# Patient Record
Sex: MALE | Race: WHITE | NOT HISPANIC OR LATINO | Employment: FULL TIME | URBAN - METROPOLITAN AREA
[De-identification: names, ages, dates, MRNs, and addresses within clinical notes are randomized per-mention and may not be internally consistent; named-entity substitution may affect disease eponyms.]

---

## 2017-08-05 ENCOUNTER — TRANSCRIBE ORDERS (OUTPATIENT)
Dept: ADMINISTRATIVE | Age: 37
End: 2017-08-05

## 2017-08-05 ENCOUNTER — APPOINTMENT (OUTPATIENT)
Dept: LAB | Age: 37
End: 2017-08-05

## 2017-08-05 DIAGNOSIS — Z00.8 HEALTH EXAMINATION IN POPULATION SURVEY: ICD-10-CM

## 2017-08-05 DIAGNOSIS — Z00.8 HEALTH EXAMINATION IN POPULATION SURVEY: Primary | ICD-10-CM

## 2017-08-05 LAB
CHOLEST SERPL-MCNC: 197 MG/DL (ref 50–200)
EST. AVERAGE GLUCOSE BLD GHB EST-MCNC: 103 MG/DL
HBA1C MFR BLD: 5.2 % (ref 4.2–6.3)
HDLC SERPL-MCNC: 47 MG/DL (ref 40–60)
LDLC SERPL CALC-MCNC: 121 MG/DL (ref 0–100)
TRIGL SERPL-MCNC: 144 MG/DL

## 2017-08-05 PROCEDURE — 36415 COLL VENOUS BLD VENIPUNCTURE: CPT

## 2017-08-05 PROCEDURE — 80061 LIPID PANEL: CPT

## 2017-08-05 PROCEDURE — 83036 HEMOGLOBIN GLYCOSYLATED A1C: CPT

## 2018-01-18 NOTE — PROGRESS NOTES
Assessment    1  Acute swimmer's ear of left side (380 12) (H60 332)    Plan  Acute swimmer's ear of left side    · Ciprofloxacin HCl - 0 2 % Otic Solution; 4 drops to R ear BID    Discussion/Summary  Discussion Summary:   Try to keep water out of the ear  Do not use Q-tips in ear  This year should be rechecked in 4-5 days if not improving  Chief Complaint  Chief Complaint Free Text Note Form: pt reports left ear pain since Thurday      History of Present Illness  HPI: Left ear pain over last several days  Had been at the beach last week  Had tried alcohol drops which have not opened up the ear  There is no fevers, no nasal congestion, or sore throat  Hospital Based Practices Required Assessment:   Pain Assessment   the patient states they have pain  The pain is located in the left ear  The patient describes the pain as aching  (on a scale of 0 to 10, the patient rates the pain at 4 )   Abuse And Domestic Violence Screen    Yes, the patient is safe at home  The patient states no one is hurting them  Depression And Suicide Screen  No, the patient has not had thoughts of hurting themself  No, the patient has not felt depressed in the past 7 days  Prefered Language is  [place]  Primary Language is  English  Review of Systems  Focused-Male:   Constitutional: no fever, not feeling poorly, no chills and not feeling tired  ENT: as noted in HPI  Respiratory: no shortness of breath, no cough and no wheezing  Active Problems    1  Abdominal pain (789 00) (R10 9)   2  Lipoma of skin and subcutaneous tissue (214 1) (D17 30)    Past Medical History    1  History of Patient denies medical problems (V49 89) (Z78 9)  Active Problems And Past Medical History Reviewed: The active problems and past medical history were reviewed and updated today  Family History  Father    1  Family history of diabetes mellitus (V18 0) (Z83 3)  Maternal Grandmother    2   Family history of Ovarian cancer  Paternal Grandmother    3  Family history of arthritis (V17 7) (Z82 61)  Paternal Grandfather    4  Family history of arthritis (V17 7) (Z82 61)    Social History    · Always uses seat belt   · Full-time employment   ·    · Never a smoker   · No drug use   · Occasional alcohol use  Social History Reviewed: The social history was reviewed and updated today  Surgical History    1  History of Appendectomy    Current Meds   1  No Reported Medications Recorded  Medication List Reviewed: The medication list was reviewed and updated today  Allergies    1  Amoxicillin TABS    Physical Exam    Constitutional   General appearance: No acute distress, well appearing and well nourished  Eyes   Conjunctiva and lids: No swelling, erythema, or discharge  Pupils and irises: Equal, round and reactive to light  Ears, Nose, Mouth, and Throat   External inspection of ears and nose: Abnormal   Pain with traction left ear lobe  Otoscopic examination: Abnormal   The canal is swollen with some debris being present  The tympanic membrane is partially visualized and is not erythematous  Nasal mucosa, septum, and turbinates: Normal without edema or erythema  Oropharynx: Normal with no erythema, edema, exudate or lesions  Pulmonary   Respiratory effort: No increased work of breathing or signs of respiratory distress  Auscultation of lungs: Clear to auscultation  Cardiovascular   Auscultation of heart: Normal rate and rhythm, normal S1 and S2, without murmurs  Lymphatic   Palpation of lymph nodes in neck: No lymphadenopathy         Signatures   Electronically signed by : Shandra Hamilton MD; Jul 30 2016 10:10AM EST                       (Author)

## 2018-08-05 ENCOUNTER — OFFICE VISIT (OUTPATIENT)
Dept: URGENT CARE | Age: 38
End: 2018-08-05
Payer: COMMERCIAL

## 2018-08-05 VITALS
HEIGHT: 72 IN | BODY MASS INDEX: 28.17 KG/M2 | DIASTOLIC BLOOD PRESSURE: 90 MMHG | HEART RATE: 72 BPM | RESPIRATION RATE: 20 BRPM | WEIGHT: 208 LBS | OXYGEN SATURATION: 98 % | TEMPERATURE: 97.8 F | SYSTOLIC BLOOD PRESSURE: 150 MMHG

## 2018-08-05 DIAGNOSIS — J02.9 ACUTE PHARYNGITIS, UNSPECIFIED ETIOLOGY: Primary | ICD-10-CM

## 2018-08-05 LAB — S PYO AG THROAT QL: NEGATIVE

## 2018-08-05 PROCEDURE — 87430 STREP A AG IA: CPT | Performed by: FAMILY MEDICINE

## 2018-08-05 PROCEDURE — S9088 SERVICES PROVIDED IN URGENT: HCPCS | Performed by: FAMILY MEDICINE

## 2018-08-05 PROCEDURE — 99213 OFFICE O/P EST LOW 20 MIN: CPT | Performed by: FAMILY MEDICINE

## 2018-08-05 RX ORDER — AZITHROMYCIN 250 MG/1
TABLET, FILM COATED ORAL
Qty: 6 TABLET | Refills: 0 | Status: SHIPPED | OUTPATIENT
Start: 2018-08-05 | End: 2018-08-10

## 2018-08-05 NOTE — PATIENT INSTRUCTIONS
Rapid strep negative  Will treat based on known exposure to strep  Tylenol or motrin as needed for pain or fever  Salt water gargles and throat lozenges as needed  Chloraseptic spray may help with pain  Follow up with PCP if no improvement  Go to ER with worsening symptoms  Pharyngitis   AMBULATORY CARE:   Pharyngitis , or sore throat, is inflammation of the tissues and structures in your pharynx (throat)  Pharyngitis is most often caused by bacteria  It may also be caused by a cold or flu virus  Other causes include smoking, allergies, or acid reflux  Signs and symptoms that may occur with pharyngitis:   · Sore throat or pain when you swallow    · Fever, chills, and body aches    · Hoarse or raspy voice    · Cough, runny or stuffy nose, itchy or watery eyes    · Headache    · Upset stomach and loss of appetite    · Mild neck stiffness    · Swollen glands that feel like hard lumps when you touch your neck    · White and yellow pus-filled blisters in the back of your throat  Call 911 for any of the following:   · You have trouble breathing or swallowing because your throat is swollen or sore  Seek care immediately if:   · You are drooling because it hurts too much to swallow  · Your fever is higher than 102? F (39?C) or lasts longer than 3 days  · You are confused  · You taste blood in your throat  Contact your healthcare provider if:   · Your throat pain gets worse  · You have a painful lump in your throat that does not go away after 5 days  · Your symptoms do not improve after 5 days  · You have questions or concerns about your condition or care  Treatment for pharyngitis:  Viral pharyngitis will go away on its own without treatment  Your sore throat should start to feel better in 3 to 5 days for both viral and bacterial infections  You may need any of the following  · NSAIDs , such as ibuprofen, help decrease swelling, pain, and fever   NSAIDs can cause stomach bleeding or kidney problems in certain people  If you take blood thinner medicine, always ask your healthcare provider if NSAIDs are safe for you  Always read the medicine label and follow directions  · Acetaminophen  decreases pain and fever  It is available without a doctor's order  Ask how much to take and how often to take it  Follow directions  Acetaminophen can cause liver damage if not taken correctly  Manage your symptoms:   · Gargle salt water  Mix ¼ teaspoon salt in an 8 ounce glass of warm water and gargle  This may help decrease swelling in your throat  · Drink liquids as directed  You may need to drink more liquids than usual  Liquids may help soothe your throat and prevent dehydration  Ask how much liquid to drink each day and which liquids are best for you  · Use a cool-steam humidifier  to help moisten the air in your room and calm your cough  · Soothe your throat  with cough drops, ice, soft foods, or popsicles  Prevent the spread of pharyngitis:  Cover your mouth and nose when you cough or sneeze  Do not share food or drinks  Wash your hands often  Use soap and water  If soap and water are unavailable, use an alcohol based hand   Follow up with your healthcare provider as directed:  Write down your questions so you remember to ask them during your visits  © 2017 [address] Information is for End User's use only and may not be sold, redistributed or otherwise used for commercial purposes  All illustrations and images included in CareNotes® are the copyrighted property of A D A M , Inc  or Aren Szymanski  The above information is an  only  It is not intended as medical advice for individual conditions or treatments  Talk to your doctor, nurse or pharmacist before following any medical regimen to see if it is safe and effective for you

## 2018-08-05 NOTE — PROGRESS NOTES
[facility]        NAME: Holley Osgood is a 34 y o  male  : 1980    MRN: [id number]  DATE: 2018  TIME: 9:26 AM    Assessment and Plan   Acute pharyngitis, unspecified etiology [J02 9]  1  Acute pharyngitis, unspecified etiology  azithromycin (ZITHROMAX) 250 mg tablet         Patient Instructions     Patient Instructions   Rapid strep negative  Will treat based on known exposure to strep  Tylenol or motrin as needed for pain or fever  Salt water gargles and throat lozenges as needed  Chloraseptic spray may help with pain  Follow up with PCP if no improvement  Go to ER with worsening symptoms  Chief Complaint     Chief Complaint   Patient presents with    Sore Throat     Been sick for the last 2 1/2 days with sore throat- concerned been around people who had strep throat    Cold Like Symptoms         History of Present Illness   Holley Osgood presents to the clinic c/o    This is a 34year old male here today with complaints of sore throat, congestion  Symptoms have been for about 2 5 days  No fevers  He has been using OTC cough drops  He states he was recently exposed to strep from family members while on vacation  Review of Systems   Review of Systems   Constitutional: Negative  HENT: Positive for congestion and sore throat  Negative for sinus pain and sinus pressure  Respiratory: Negative  Neurological: Negative  Psychiatric/Behavioral: Negative  Current Medications     No long-term prescriptions on file         Current Allergies     Allergies as of 2018 - Reviewed 2018   Allergen Reaction Noted    Amoxicillin  2015            The following portions of the patient's history were reviewed and updated as appropriate: allergies, current medications, past family history, past medical history, past social history, past surgical history and problem list     Objective   /90   Pulse 72   Temp 97 8 °F (36 6 °C) (Temporal) Resp 20   Ht 6' (1 829 m)   Wt 94 3 kg (208 lb)   SpO2 98%   BMI 28 21 kg/m²        Physical Exam     Physical Exam   Constitutional: He is oriented to person, place, and time  He appears well-developed and well-nourished  HENT:   Head: Normocephalic  Right Ear: External ear normal    Left Ear: External ear normal    Posterior pharynx erythemic and beefy red  Neck: Normal range of motion  Neck supple  Cardiovascular: Normal rate, regular rhythm and normal heart sounds  Pulmonary/Chest: Effort normal and breath sounds normal    Neurological: He is alert and oriented to person, place, and time  Skin: Skin is warm and dry  Psychiatric: He has a normal mood and affect  His behavior is normal    Nursing note and vitals reviewed  Rapid strep negative

## 2018-08-18 ENCOUNTER — APPOINTMENT (OUTPATIENT)
Dept: LAB | Age: 38
End: 2018-08-18

## 2018-08-18 ENCOUNTER — TRANSCRIBE ORDERS (OUTPATIENT)
Dept: ADMINISTRATIVE | Age: 38
End: 2018-08-18

## 2018-08-18 DIAGNOSIS — Z00.8 HEALTH EXAMINATION IN POPULATION SURVEY: Primary | ICD-10-CM

## 2018-08-18 DIAGNOSIS — Z00.8 HEALTH EXAMINATION IN POPULATION SURVEY: ICD-10-CM

## 2018-08-18 LAB
CHOLEST SERPL-MCNC: 173 MG/DL (ref 50–200)
EST. AVERAGE GLUCOSE BLD GHB EST-MCNC: 100 MG/DL
HBA1C MFR BLD: 5.1 % (ref 4.2–6.3)
HDLC SERPL-MCNC: 42 MG/DL (ref 40–60)
LDLC SERPL CALC-MCNC: 109 MG/DL (ref 0–100)
NONHDLC SERPL-MCNC: 131 MG/DL
TRIGL SERPL-MCNC: 110 MG/DL

## 2018-08-18 PROCEDURE — 36415 COLL VENOUS BLD VENIPUNCTURE: CPT

## 2018-08-18 PROCEDURE — 80061 LIPID PANEL: CPT

## 2018-08-18 PROCEDURE — 83036 HEMOGLOBIN GLYCOSYLATED A1C: CPT

## 2021-04-08 DIAGNOSIS — Z23 ENCOUNTER FOR IMMUNIZATION: ICD-10-CM

## 2021-04-09 ENCOUNTER — IMMUNIZATIONS (OUTPATIENT)
Dept: FAMILY MEDICINE CLINIC | Facility: HOSPITAL | Age: 41
End: 2021-04-09

## 2021-04-09 DIAGNOSIS — Z23 ENCOUNTER FOR IMMUNIZATION: ICD-10-CM

## 2021-11-09 ENCOUNTER — IMMUNIZATIONS (OUTPATIENT)
Dept: FAMILY MEDICINE CLINIC | Facility: HOSPITAL | Age: 41
End: 2021-11-09

## 2021-11-09 DIAGNOSIS — Z23 ENCOUNTER FOR IMMUNIZATION: Primary | ICD-10-CM

## 2021-11-09 PROCEDURE — 91300 COVID-19 PFIZER VACC 0.3 ML: CPT

## 2021-11-09 PROCEDURE — 0001A COVID-19 PFIZER VACC 0.3 ML: CPT

## 2025-02-28 ENCOUNTER — OFFICE VISIT (OUTPATIENT)
Dept: URGENT CARE | Age: 45
End: 2025-02-28
Payer: COMMERCIAL

## 2025-02-28 ENCOUNTER — HOSPITAL ENCOUNTER (EMERGENCY)
Facility: HOSPITAL | Age: 45
Discharge: HOME | End: 2025-03-01
Attending: EMERGENCY MEDICINE
Payer: COMMERCIAL

## 2025-02-28 ENCOUNTER — APPOINTMENT (EMERGENCY)
Dept: RADIOLOGY | Facility: HOSPITAL | Age: 45
End: 2025-02-28
Payer: COMMERCIAL

## 2025-02-28 VITALS
RESPIRATION RATE: 20 BRPM | TEMPERATURE: 97.8 F | BODY MASS INDEX: 32.79 KG/M2 | WEIGHT: 234.2 LBS | DIASTOLIC BLOOD PRESSURE: 100 MMHG | OXYGEN SATURATION: 97 % | HEART RATE: 85 BPM | SYSTOLIC BLOOD PRESSURE: 130 MMHG | HEIGHT: 71 IN

## 2025-02-28 DIAGNOSIS — I10 HYPERTENSION: ICD-10-CM

## 2025-02-28 DIAGNOSIS — R51.9 OCCIPITAL HEADACHE: Primary | ICD-10-CM

## 2025-02-28 DIAGNOSIS — R51.9 HEADACHE: Primary | ICD-10-CM

## 2025-02-28 LAB
ALBUMIN SERPL BCG-MCNC: 4.6 G/DL (ref 3.5–5)
ALP SERPL-CCNC: 62 U/L (ref 34–104)
ALT SERPL W P-5'-P-CCNC: 60 U/L (ref 7–52)
ANION GAP SERPL CALCULATED.3IONS-SCNC: 8 MMOL/L (ref 4–13)
AST SERPL W P-5'-P-CCNC: 33 U/L (ref 13–39)
BASOPHILS # BLD AUTO: 0.04 THOUSANDS/ÂΜL (ref 0–0.1)
BASOPHILS NFR BLD AUTO: 1 % (ref 0–1)
BILIRUB SERPL-MCNC: 0.48 MG/DL (ref 0.2–1)
BUN SERPL-MCNC: 14 MG/DL (ref 5–25)
CALCIUM SERPL-MCNC: 10.1 MG/DL (ref 8.4–10.2)
CHLORIDE SERPL-SCNC: 103 MMOL/L (ref 96–108)
CO2 SERPL-SCNC: 27 MMOL/L (ref 21–32)
CREAT SERPL-MCNC: 0.91 MG/DL (ref 0.6–1.3)
EOSINOPHIL # BLD AUTO: 0.28 THOUSAND/ÂΜL (ref 0–0.61)
EOSINOPHIL NFR BLD AUTO: 4 % (ref 0–6)
ERYTHROCYTE [DISTWIDTH] IN BLOOD BY AUTOMATED COUNT: 11.5 % (ref 11.6–15.1)
GFR SERPL CREATININE-BSD FRML MDRD: 100 ML/MIN/1.73SQ M
GLUCOSE SERPL-MCNC: 90 MG/DL (ref 65–140)
HCT VFR BLD AUTO: 44.8 % (ref 36.5–49.3)
HGB BLD-MCNC: 15.8 G/DL (ref 12–17)
IMM GRANULOCYTES # BLD AUTO: 0.02 THOUSAND/UL (ref 0–0.2)
IMM GRANULOCYTES NFR BLD AUTO: 0 % (ref 0–2)
LYMPHOCYTES # BLD AUTO: 3.07 THOUSANDS/ÂΜL (ref 0.6–4.47)
LYMPHOCYTES NFR BLD AUTO: 43 % (ref 14–44)
MCH RBC QN AUTO: 31 PG (ref 26.8–34.3)
MCHC RBC AUTO-ENTMCNC: 35.3 G/DL (ref 31.4–37.4)
MCV RBC AUTO: 88 FL (ref 82–98)
MONOCYTES # BLD AUTO: 0.55 THOUSAND/ÂΜL (ref 0.17–1.22)
MONOCYTES NFR BLD AUTO: 8 % (ref 4–12)
NEUTROPHILS # BLD AUTO: 3.26 THOUSANDS/ÂΜL (ref 1.85–7.62)
NEUTS SEG NFR BLD AUTO: 44 % (ref 43–75)
NRBC BLD AUTO-RTO: 0 /100 WBCS
PLATELET # BLD AUTO: 189 THOUSANDS/UL (ref 149–390)
PMV BLD AUTO: 11.9 FL (ref 8.9–12.7)
POTASSIUM SERPL-SCNC: 4.2 MMOL/L (ref 3.5–5.3)
PROT SERPL-MCNC: 7 G/DL (ref 6.4–8.4)
RBC # BLD AUTO: 5.1 MILLION/UL (ref 3.88–5.62)
SODIUM SERPL-SCNC: 138 MMOL/L (ref 135–147)
WBC # BLD AUTO: 7.22 THOUSAND/UL (ref 4.31–10.16)

## 2025-02-28 PROCEDURE — 85025 COMPLETE CBC W/AUTO DIFF WBC: CPT

## 2025-02-28 PROCEDURE — 96368 THER/DIAG CONCURRENT INF: CPT

## 2025-02-28 PROCEDURE — 80053 COMPREHEN METABOLIC PANEL: CPT

## 2025-02-28 PROCEDURE — 70496 CT ANGIOGRAPHY HEAD: CPT

## 2025-02-28 PROCEDURE — 99284 EMERGENCY DEPT VISIT MOD MDM: CPT

## 2025-02-28 PROCEDURE — 36415 COLL VENOUS BLD VENIPUNCTURE: CPT

## 2025-02-28 PROCEDURE — 70498 CT ANGIOGRAPHY NECK: CPT

## 2025-02-28 PROCEDURE — 96366 THER/PROPH/DIAG IV INF ADDON: CPT

## 2025-02-28 PROCEDURE — 99203 OFFICE O/P NEW LOW 30 MIN: CPT

## 2025-02-28 PROCEDURE — 96375 TX/PRO/DX INJ NEW DRUG ADDON: CPT

## 2025-02-28 PROCEDURE — 96365 THER/PROPH/DIAG IV INF INIT: CPT

## 2025-02-28 PROCEDURE — 99285 EMERGENCY DEPT VISIT HI MDM: CPT | Performed by: EMERGENCY MEDICINE

## 2025-02-28 RX ORDER — MAGNESIUM SULFATE HEPTAHYDRATE 40 MG/ML
2 INJECTION, SOLUTION INTRAVENOUS ONCE
Status: COMPLETED | OUTPATIENT
Start: 2025-02-28 | End: 2025-02-28

## 2025-02-28 RX ORDER — METOCLOPRAMIDE HYDROCHLORIDE 5 MG/ML
10 INJECTION INTRAMUSCULAR; INTRAVENOUS ONCE
Status: COMPLETED | OUTPATIENT
Start: 2025-02-28 | End: 2025-02-28

## 2025-02-28 RX ORDER — METHOCARBAMOL 500 MG/1
500 TABLET, FILM COATED ORAL 2 TIMES DAILY PRN
Qty: 10 TABLET | Refills: 0 | Status: SHIPPED | OUTPATIENT
Start: 2025-02-28 | End: 2025-02-28

## 2025-02-28 RX ORDER — ACETAMINOPHEN 325 MG/1
650 TABLET ORAL ONCE
Status: COMPLETED | OUTPATIENT
Start: 2025-02-28 | End: 2025-02-28

## 2025-02-28 RX ORDER — LABETALOL HYDROCHLORIDE 5 MG/ML
10 INJECTION, SOLUTION INTRAVENOUS ONCE
Status: DISCONTINUED | OUTPATIENT
Start: 2025-02-28 | End: 2025-03-01 | Stop reason: HOSPADM

## 2025-02-28 RX ORDER — METHOCARBAMOL 500 MG/1
500 TABLET, FILM COATED ORAL 2 TIMES DAILY PRN
Qty: 10 TABLET | Refills: 0 | Status: SHIPPED | OUTPATIENT
Start: 2025-02-28 | End: 2025-03-06

## 2025-02-28 RX ORDER — AMLODIPINE BESYLATE 2.5 MG/1
2.5 TABLET ORAL DAILY
Qty: 30 TABLET | Refills: 0 | Status: SHIPPED | OUTPATIENT
Start: 2025-02-28 | End: 2025-03-01

## 2025-02-28 RX ADMIN — MAGNESIUM SULFATE HEPTAHYDRATE 2 G: 40 INJECTION, SOLUTION INTRAVENOUS at 21:27

## 2025-02-28 RX ADMIN — IOHEXOL 75 ML: 350 INJECTION, SOLUTION INTRAVENOUS at 22:06

## 2025-02-28 RX ADMIN — METOCLOPRAMIDE 10 MG: 5 INJECTION, SOLUTION INTRAMUSCULAR; INTRAVENOUS at 21:27

## 2025-02-28 RX ADMIN — SODIUM CHLORIDE, SODIUM LACTATE, POTASSIUM CHLORIDE, AND CALCIUM CHLORIDE 1000 ML: .6; .31; .03; .02 INJECTION, SOLUTION INTRAVENOUS at 21:27

## 2025-02-28 RX ADMIN — ACETAMINOPHEN 650 MG: 325 TABLET, FILM COATED ORAL at 21:27

## 2025-02-28 NOTE — Clinical Note
Lavell Garcia was seen and treated in our emergency department on 2/28/2025.                Diagnosis: Nonintractable Headache    Lavell  .    He may return on this date: 03/03/2025    Patient will require strict outpatient follow up, please excuse him on date of follow up appointment currently TBD pending availability.     If you have any questions or concerns, please don't hesitate to call.      Nanette Voss, DO    ______________________________           _______________          _______________  Hospital Representative                              Date                                Time

## 2025-02-28 NOTE — PATIENT INSTRUCTIONS
Please begin Robaxin as directed-do not drive or operate machinery within 8 hours of taking.   Please continue Tylenol, may alternate with Ibuprofen as needed.   Follow up with PCP as soon as possible.   Strict ED precautions reviewed with patient. Strongly recommend further evaluation in the emergency department for worsening headache, dizziness, vision changes, etc.

## 2025-02-28 NOTE — PROGRESS NOTES
[facility]  Name: Lavell Garcia      : 1980      MRN: [id number]  Encounter Provider: MIESHA Paz  Encounter Date: 2025   Encounter department: [facility] [place]  :  44 year old male presents with 5-day history of occipital headache which responds to interventions which relax neck muscles, will add muscle relaxer and NSAID to medication regimen. No red flag signs/symptoms on exam.   Please begin Robaxin as directed-do not drive or operate machinery within 8 hours of taking.   Please continue Tylenol, may alternate with Ibuprofen as needed.   Follow up with PCP as soon as possible.   Strict ED precautions reviewed with patient. Strongly recommend further evaluation in the emergency department for worsening headache, dizziness, vision changes, etc.   Assessment & Plan  Occipital headache    Orders:    methocarbamol (ROBAXIN) 500 mg tablet; Take 1 tablet (500 mg total) by mouth 2 (two) times a day as needed for muscle spasms (occipital headache) for up to 5 days        Patient Instructions  Patient Education     Headache, Adult ED   General Information   You came to the Emergency Department (ED) today for a headache. The doctors feel it is unlikely that something serious is causing your headache and it is safe for you to recover at home.  You may be waiting on some test results. If so, the staff will contact you if there are concerning results.  What care is needed at home?   Call your regular doctor to let them know you were in the ED. Make a follow-up appointment if you are told to.  You can take drugs like acetaminophen, ibuprofen, or naproxen for pain as instructed, but use of these pain medicines should be limited. If you need to take pain medicines every day for headaches, call your doctor.  If possible, lie down in a quiet, dark room.  Make sure you eat at regular times. Do not skip meals. Drink plenty of fluids. Be sure you are getting enough sleep.  If you have  "frequent headaches that interfere with your activities, you can keep a \"headache diary.\" This might help to see if there is a pattern to your headaches. Make notes about:  Where your pain is on your head or neck.  When you have the pain and how long it lasts.  How your pain feels. Is it dull, sharp, burning, stabbing, or cramping?  What causes your pain?  What makes your pain better or worse?  When do I need to get emergency help?   Call for an ambulance right away if:   You have a seizure.  You have signs of stroke like sudden:  Numbness or weakness of the face, arm. or leg, especially on one side of the body.  Confusion, trouble speaking, or understanding.  Trouble seeing in one or both eyes.  Trouble walking, dizziness, loss of balance, or coordination.  Severe headache with no known cause.  You feel extremely weak, confused, or lethargic, or you pass out.  You have a headache along with neck pain, neck stiffness, fever, or chills.  You have a headache along with a new skin rash.  You have significant nausea or vomiting with your headache.  When do I need to call the doctor?   The headache lasts more than a few days or the pain gets worse or comes more often.  You have new or worsening symptoms.  Last Reviewed Date   2020-06-16  Consumer Information Use and Disclaimer   This generalized information is a limited summary of diagnosis, treatment, and/or medication information. It is not meant to be comprehensive and should be used as a tool to help the user understand and/or assess potential diagnostic and treatment options. It does NOT include all information about conditions, treatments, medications, side effects, or risks that may apply to a specific patient. It is not intended to be medical advice or a substitute for the medical advice, diagnosis, or treatment of a health care provider based on the health care provider's examination and assessment of a patient’s specific and unique circumstances. Patients must " "speak with a health care provider for complete information about their health, medical questions, and treatment options, including any risks or benefits regarding use of medications. This information does not endorse any treatments or medications as safe, effective, or approved for treating a specific patient. [employer], Inc[employer] and its affiliates disclaim any warranty or liability relating to this information or the use thereof. The use of this information is governed by the Terms of Use, available at https://www.JoMaJaer.com/en/know/clinical-effectiveness-terms   Copyright   Copyright © 2024 [employer], Inc. and its affiliates and/or licensors. All rights reserved.  Follow up with PCP in 3-5 days.  Proceed to  ER if symptoms worsen.    If tests are performed, our office will contact you with results only if changes need to made to the care plan discussed with you at the visit. You can review your full results on [facility] MyChart.    Chief Complaint:   Chief Complaint   Patient presents with    Headache     Started Sunday. Got a sharp pain in the back of head. Going up to his temple. Not congested, just a lot of pressure.       History of Present Illness   Patient is a 44 year old male with PMH significant for migraines, who presents for evaluation of occipital headache which began 5 days ago. He notes that the headache differs from his usual migraine (which occurs over the right eye), and reports that instead this headache starts on both sides of the back of his head and radiates to his bilateral temples. He initially attributed his symptoms to sinus congestion (although he does not feel particularly congested), and he took Mucinex and Tylenol without relief. He notes that the headache feels like a \"pressure\", is intermittent and seems to improve with neck stretches and hot showers. Neck movement seems to aggravate the pain, although he has no limitation in movement. He denies dizziness, vision changes, fever, " "tinnitus, chest pain, palpitations, numbness, tingling, focal weakness. He notes that he has not been to his PCP \"in years\".           Review of Systems   Constitutional:  Negative for fatigue and fever.   HENT:  Negative for congestion, ear discharge, ear pain, postnasal drip, rhinorrhea, sinus pressure, sinus pain, sneezing and sore throat.    Eyes: Negative.  Negative for pain, discharge, redness and itching.   Respiratory: Negative.  Negative for apnea, cough, choking, chest tightness, shortness of breath, wheezing and stridor.    Cardiovascular: Negative.  Negative for chest pain and palpitations.   Gastrointestinal: Negative.  Negative for diarrhea, nausea and vomiting.   Endocrine: Negative.  Negative for polydipsia, polyphagia and polyuria.   Genitourinary: Negative.  Negative for decreased urine volume and flank pain.   Musculoskeletal: Negative.  Negative for arthralgias, back pain, myalgias, neck pain and neck stiffness.   Skin: Negative.  Negative for color change and rash.   Allergic/Immunologic: Negative.  Negative for environmental allergies.   Neurological:  Positive for headaches. Negative for dizziness, facial asymmetry, light-headedness and numbness.   Hematological: Negative.  Negative for adenopathy.   Psychiatric/Behavioral: Negative.       Past Medical History   No past medical history on file.  No past surgical history on file.  No family history on file.  he reports that he has never smoked. He has never used smokeless tobacco. He reports that he does not drink alcohol.  Current Outpatient Medications   Medication Instructions    methocarbamol (ROBAXIN) 500 mg, Oral, 2 times daily PRN     Allergies   Allergen Reactions    Amoxicillin       Objective   /100   Pulse 85   Temp 97.8 °F (36.6 °C) (Tympanic)   Resp 20   Ht 5' 11\" (1.803 m)   Wt 106 kg (234 lb 3.2 oz)   SpO2 97%   BMI 32.66 kg/m²      Physical Exam  Vitals and nursing note reviewed.   Constitutional:       General: He " is not in acute distress.     Appearance: He is well-developed. He is not ill-appearing, toxic-appearing or diaphoretic.      Interventions: He is not intubated.  HENT:      Head: Normocephalic and atraumatic.      Right Ear: Hearing, tympanic membrane, ear canal and external ear normal. Tympanic membrane is not erythematous or bulging.      Left Ear: Hearing, tympanic membrane, ear canal and external ear normal. Tympanic membrane is not erythematous or bulging.   Eyes:      General: Lids are normal. Lids are everted, no foreign bodies appreciated. Vision grossly intact. Gaze aligned appropriately.      Extraocular Movements:      Right eye: Normal extraocular motion and no nystagmus.      Left eye: Normal extraocular motion and no nystagmus.      Conjunctiva/sclera: Conjunctivae normal.      Right eye: Right conjunctiva is not injected. No chemosis, exudate or hemorrhage.     Left eye: Left conjunctiva is not injected. No chemosis, exudate or hemorrhage.     Comments: No papilledema on funduscopic exam.    Neck:      Thyroid: No thyroid mass, thyromegaly or thyroid tenderness.      Meningeal: Brudzinski's sign absent.   Cardiovascular:      Rate and Rhythm: Normal rate and regular rhythm.      Heart sounds: Normal heart sounds, S1 normal and S2 normal. Heart sounds not distant. No murmur heard.  Pulmonary:      Effort: Pulmonary effort is normal. No tachypnea, bradypnea, accessory muscle usage, prolonged expiration, respiratory distress or retractions. He is not intubated.      Breath sounds: Normal breath sounds. No stridor, decreased air movement or transmitted upper airway sounds. No decreased breath sounds, wheezing, rhonchi or rales.   Abdominal:      Palpations: Abdomen is soft.      Tenderness: There is no abdominal tenderness.   Musculoskeletal:         General: No swelling.      Cervical back: Full passive range of motion without pain, normal range of motion and neck supple. No spinous process tenderness  or muscular tenderness. Normal range of motion.   Lymphadenopathy:      Cervical: No cervical adenopathy.      Right cervical: No superficial cervical adenopathy.     Left cervical: No superficial cervical adenopathy.   Skin:     General: Skin is warm and dry.      Capillary Refill: Capillary refill takes less than 2 seconds.   Neurological:      General: No focal deficit present.      Mental Status: He is alert and oriented to person, place, and time.      GCS: GCS eye subscore is 4. GCS verbal subscore is 5. GCS motor subscore is 6.      Cranial Nerves: Cranial nerves 2-12 are intact.      Sensory: Sensation is intact.      Motor: Motor function is intact.      Coordination: Coordination is intact.      Gait: Gait is intact.   Psychiatric:         Mood and Affect: Mood normal.

## 2025-03-01 VITALS
HEART RATE: 70 BPM | TEMPERATURE: 97.9 F | DIASTOLIC BLOOD PRESSURE: 88 MMHG | RESPIRATION RATE: 22 BRPM | SYSTOLIC BLOOD PRESSURE: 140 MMHG | OXYGEN SATURATION: 98 %

## 2025-03-01 RX ORDER — AMLODIPINE BESYLATE 2.5 MG/1
5 TABLET ORAL DAILY
Qty: 60 TABLET | Refills: 0 | Status: SHIPPED | OUTPATIENT
Start: 2025-03-01

## 2025-03-01 NOTE — ED ATTENDING ATTESTATION
"I, Chuy Bolton MD, saw and evaluated the patient. I have discussed the patient with the resident and agree with the resident's findings, Plan of Care, and MDM as documented in the resident's note, except where noted. All available labs and Radiology studies were reviewed.  I was present for key portions of any procedure(s) performed by the resident and I was immediately available to provide assistance.    At this point I agree with the current assessment done in the Emergency Department.  I have conducted an independent evaluation of this patient a history and physical is as follows:    43 yo male with a history of migraine headaches presents to the ED complaining of a headache x 6 days. The patient reports a sudden onset posterior headache while washing his car on Sunday afternoon. The pain is described as a \"pressure\" in the bilateral occiput that radiates into his bilateral temples. He initially thought the headache was due to sinus congestion so he took Mucinex and APAP but he did not get any relief. Since Sunday the headache has \"come and gone\". He reports some relief with stretching his neck and taking hot showers. No visual disturbance, nausea, vomiting, neck pain, or neck stiffness. He denies fevers, chills, numbness,and weakness. Headache is different than his typical migraine. He is also experiencing some \"heaviness\" in his eyes although his vision is normal. No other specific complaints.    *Of note, the patient was seen at an Urgent Care this afternoon and prescribed a muscle relaxer. He says his headache transiently improved after taking the medication but returned after about 30 minutes so he decided to come to the ED.    ROS: per resident physician note    Gen: NAD, AA&Ox3  HEENT: PERRL, EOMI, no nystagmus  Neck: supple, FROM without discomfort  CV: RRR  Lungs: CTA B/L  Abdomen: soft, NT/ND  Ext: no swelling or deformity  Neuro: 5/5 strength all extremities, sensation grossly intact, steady " gait  Skin: no rash    ED Course  The patient is comfortable appearing with stale vital signs other than a moderately elevated BP. Physical examination is benign. Unclear etiology of headache x 6 days. Migraine vs tension headache vs ICH vs hypertension-related headache? Very low clinical suspicion for an infectious process (meningitis, encephalitis). Will check basic labs and CTA head/neck. IVFs, APAP, Reglan, and Mg administered. Will continue to monitor in the ED. Disposition per workup and reassessment.    23:30 Care signed out to Dr. Landeros with formal CTA read and reassessment pending...    Critical Care Time  Procedures

## 2025-03-01 NOTE — ED PROVIDER NOTES
Time reflects when diagnosis was documented in both MDM as applicable and the Disposition within this note       Time User Action Codes Description Comment    2/28/2025 11:15 PM MargieKeshav trevino Add [R51.9] Headache     2/28/2025 11:28 PM Nanette Johnson Add [I10] Hypertension           ED Disposition       ED Disposition   Discharge    Condition   Stable    Date/Time   Sat Mar 1, 2025 12:19 AM    Comment   Lavell GRANT Radha discharge to home/self care.                   Assessment & Plan       Medical Decision Making  Given patient's symptoms headache that he states feels different from his migraine headache as well as sudden onset of maximal intensity proceeded to CTA his brain looking for any bleeds versus aneurysms versus tumors.  In addition to this we will also treat symptomatically with migraine medication.  After following up with the patient patient continued to have no neurological complaints other than the headache however he did state that his headache is improved and felt better overall.  Placed a referral to neurology for further migraine headache management.  Patient CTA head was negative and unremarkable.  Given this I have low concern for any intracranial abnormality and think patient is safe to follow-up with neurology as an outpatient.  Good return precautions noted.  Patient agreeable stable for discharge.    Amount and/or Complexity of Data Reviewed  Labs: ordered.  Radiology: ordered.    Risk  OTC drugs.  Prescription drug management.             Medications   lactated ringers bolus 1,000 mL (0 mL Intravenous Stopped 2/28/25 2332)   metoclopramide (REGLAN) injection 10 mg (10 mg Intravenous Given 2/28/25 2127)   magnesium sulfate 2 g/50 mL IVPB (premix) 2 g (0 g Intravenous Stopped 2/28/25 2332)   acetaminophen (TYLENOL) tablet 650 mg (650 mg Oral Given 2/28/25 2127)   iohexol (OMNIPAQUE) 350 MG/ML injection (SINGLE-DOSE) 75 mL (75 mL Intravenous Given 2/28/25 2206)       ED Risk Strat Scores     "                                            History of Present Illness       Chief Complaint   Patient presents with    Headache     Pt c/o of \"spontaneous headache\" that started last Sunday. Pt states he was at [facility] earlier today where they gave him a muscle relaxer and told him to come to the ER if his headache didn't improve. Pt states the headache never fully went away.       History reviewed. No pertinent past medical history.   History reviewed. No pertinent surgical history.   History reviewed. No pertinent family history.   Social History     Tobacco Use    Smoking status: Never    Smokeless tobacco: Never   Substance Use Topics    Alcohol use: No      E-Cigarette/Vaping      E-Cigarette/Vaping Substances      I have reviewed and agree with the history as documented.     Patient is a 44-year-old male who states that 6 days ago he was washing his car when he had a sudden onset maximal intensity posterior headache radiating to the front of his with some eye heaviness.  He states he went to the urgent care and was prescribed Robaxin which helped him for about 30 minutes but the headache returned.  He states that he tried his normal migraine medication but the headache is refractory.  He states that he does not feel like his typical migraine headache.  Patient denies any numbness, weakness, changes in vision, double vision, lightheadedness, difficulty ambulating.      Headache  Associated symptoms: no abdominal pain, no cough, no fever and no vomiting        Review of Systems   Constitutional:  Negative for fever.   Respiratory:  Negative for cough and shortness of breath.    Cardiovascular:  Negative for chest pain and palpitations.   Gastrointestinal:  Negative for abdominal pain and vomiting.   Genitourinary:  Negative for dysuria.   Skin:  Negative for rash.   Neurological:  Positive for headaches. Negative for syncope.   All other systems reviewed and are negative.          Objective       ED " Triage Vitals [02/28/25 2009]   Temperature Pulse Blood Pressure Respirations SpO2 Patient Position - Orthostatic VS   97.9 °F (36.6 °C) 70 (!) 201/147 22 98 % Sitting      Temp src Heart Rate Source BP Location FiO2 (%) Pain Score    -- -- Left arm -- --      Vitals      Date and Time Temp Pulse SpO2 Resp BP Pain Score FACES Pain Rating User   03/01/25 0022 -- -- -- -- 140/88 -- -- JT   02/28/25 2218 -- -- -- -- 148/92 -- -- JT   02/28/25 2137 -- -- -- -- 170/100 -- -- JT   02/28/25 2009 97.9 °F (36.6 °C) 70 98 % 22 201/147 -- -- RG            Physical Exam  Vitals and nursing note reviewed.   Constitutional:       General: He is not in acute distress.     Appearance: He is well-developed.   HENT:      Head: Normocephalic and atraumatic.   Eyes:      Conjunctiva/sclera: Conjunctivae normal.   Cardiovascular:      Rate and Rhythm: Normal rate and regular rhythm.   Pulmonary:      Effort: Pulmonary effort is normal. No respiratory distress.   Abdominal:      General: There is no distension.      Palpations: Abdomen is soft.   Musculoskeletal:         General: Normal range of motion.      Cervical back: Normal range of motion.   Skin:     Capillary Refill: Capillary refill takes less than 2 seconds.   Neurological:      Mental Status: He is alert and oriented to person, place, and time. Mental status is at baseline.      GCS: GCS eye subscore is 4. GCS verbal subscore is 5. GCS motor subscore is 6.      Cranial Nerves: Cranial nerves 2-12 are intact. No cranial nerve deficit, dysarthria or facial asymmetry.      Sensory: Sensation is intact.      Motor: Motor function is intact.      Coordination: Coordination normal. Finger-Nose-Finger Test normal.      Gait: Gait is intact.   Psychiatric:         Mood and Affect: Mood normal.         Results Reviewed       Procedure Component Value Units Date/Time    Comprehensive metabolic panel [[id number]]  (Abnormal) Collected: 02/28/25 2109    Lab Status: Final result Specimen:  Blood from Arm, Right Updated: 02/28/25 2144     Sodium 138 mmol/L      Potassium 4.2 mmol/L      Chloride 103 mmol/L      CO2 27 mmol/L      ANION GAP 8 mmol/L      BUN 14 mg/dL      Creatinine 0.91 mg/dL      Glucose 90 mg/dL      Calcium 10.1 mg/dL      AST 33 U/L      ALT 60 U/L      Alkaline Phosphatase 62 U/L      Total Protein 7.0 g/dL      Albumin 4.6 g/dL      Total Bilirubin 0.48 mg/dL      eGFR 100 ml/min/1.73sq m     Narrative:      National Kidney Disease Foundation guidelines for Chronic Kidney Disease (CKD):     Stage 1 with normal or high GFR (GFR > 90 mL/min/1.73 square meters)    Stage 2 Mild CKD (GFR = 60-89 mL/min/1.73 square meters)    Stage 3A Moderate CKD (GFR = 45-59 mL/min/1.73 square meters)    Stage 3B Moderate CKD (GFR = 30-44 mL/min/1.73 square meters)    Stage 4 Severe CKD (GFR = 15-29 mL/min/1.73 square meters)    Stage 5 End Stage CKD (GFR <15 mL/min/1.73 square meters)  Note: GFR calculation is accurate only with a steady state creatinine    CBC and differential [[id number]]  (Abnormal) Collected: 02/28/25 2109    Lab Status: Final result Specimen: Blood from Arm, Right Updated: 02/28/25 2128     WBC 7.22 Thousand/uL      RBC 5.10 Million/uL      Hemoglobin 15.8 g/dL      Hematocrit 44.8 %      MCV 88 fL      MCH 31.0 pg      MCHC 35.3 g/dL      RDW 11.5 %      MPV 11.9 fL      Platelets 189 Thousands/uL      nRBC 0 /100 WBCs      Segmented % 44 %      Immature Grans % 0 %      Lymphocytes % 43 %      Monocytes % 8 %      Eosinophils Relative 4 %      Basophils Relative 1 %      Absolute Neutrophils 3.26 Thousands/µL      Absolute Immature Grans 0.02 Thousand/uL      Absolute Lymphocytes 3.07 Thousands/µL      Absolute Monocytes 0.55 Thousand/µL      Eosinophils Absolute 0.28 Thousand/µL      Basophils Absolute 0.04 Thousands/µL             CTA head and neck with and without contrast   Final Interpretation by Ramiro Wiley MD (03/01 0000)      CT Brain:  No acute intracranial  abnormality.      CT Angiography:  Unremarkable CTA neck and brain. No hemodynamically significant stenosis, occlusion, dissection, or aneurysm.                  Workstation performed: [id number]             Procedures    ED Medication and Procedure Management   Prior to Admission Medications   Prescriptions Last Dose Informant Patient Reported? Taking?   methocarbamol (ROBAXIN) 500 mg tablet   No No   Sig: Take 1 tablet (500 mg total) by mouth 2 (two) times a day as needed for muscle spasms (occipital headache) for up to 5 days      Facility-Administered Medications: None     Discharge Medication List as of 3/1/2025 12:21 AM        CONTINUE these medications which have CHANGED    Details   amLODIPine (NORVASC) 2.5 mg tablet Take 2 tablets (5 mg total) by mouth daily, Starting Sat 3/1/2025, Normal           CONTINUE these medications which have NOT CHANGED    Details   methocarbamol (ROBAXIN) 500 mg tablet Take 1 tablet (500 mg total) by mouth 2 (two) times a day as needed for muscle spasms (occipital headache) for up to 5 days, Starting Fri 2/28/2025, Until Wed 3/5/2025 at 2359, Normal             ED SEPSIS DOCUMENTATION   Time reflects when diagnosis was documented in both MDM as applicable and the Disposition within this note       Time User Action Codes Description Comment    2/28/2025 11:15 PM Keshav Tobin Add [R51.9] Headache     2/28/2025 11:28 PM Nanette Johnson [I10] Hypertension                  Keshav Tobin, DO  03/02/25 1601

## 2025-03-01 NOTE — DISCHARGE INSTRUCTIONS
You have been evaluated in the Emergency Department today for headache. Your evaluation was not suggestive of any emergent condition requiring medical intervention at this time; however, some problems may take more time to appear. Therefore, it is important for you to watch for any new symptoms or worsening of your current condition.     Please follow up with your primary care physician and neurologist as soon as possible.     Please follow up with your primary care provider as soon as possible.    If you develop any of the following, please return to the Emergency Department immediately:  Severe/Worsening headache  Somnolence/Confusion/Excessive sleepiness   Restless/Unsteadiness  Seizures/Fainting/Loss of consciousness  Difficulties with vision  Vomiting/Fever/Stiff neck  Incontinence of stool or urine  New weakness or numbness anywhere  Signs or symptoms of a stroke

## 2025-03-04 ENCOUNTER — OFFICE VISIT (OUTPATIENT)
Dept: FAMILY MEDICINE CLINIC | Facility: CLINIC | Age: 45
End: 2025-03-04
Payer: COMMERCIAL

## 2025-03-04 ENCOUNTER — APPOINTMENT (OUTPATIENT)
Dept: LAB | Age: 45
End: 2025-03-04
Payer: COMMERCIAL

## 2025-03-04 VITALS
SYSTOLIC BLOOD PRESSURE: 130 MMHG | WEIGHT: 230 LBS | BODY MASS INDEX: 32.2 KG/M2 | OXYGEN SATURATION: 97 % | TEMPERATURE: 97.1 F | HEART RATE: 91 BPM | RESPIRATION RATE: 18 BRPM | HEIGHT: 71 IN | DIASTOLIC BLOOD PRESSURE: 82 MMHG

## 2025-03-04 DIAGNOSIS — I10 PRIMARY HYPERTENSION: Primary | ICD-10-CM

## 2025-03-04 DIAGNOSIS — R74.01 ELEVATED ALT MEASUREMENT: ICD-10-CM

## 2025-03-04 DIAGNOSIS — R06.83 LOUD SNORING: ICD-10-CM

## 2025-03-04 DIAGNOSIS — Z76.89 ENCOUNTER TO ESTABLISH CARE: ICD-10-CM

## 2025-03-04 DIAGNOSIS — Z86.69 HISTORY OF MIGRAINE: ICD-10-CM

## 2025-03-04 DIAGNOSIS — E78.00 BORDERLINE HYPERCHOLESTEROLEMIA: ICD-10-CM

## 2025-03-04 DIAGNOSIS — I10 PRIMARY HYPERTENSION: ICD-10-CM

## 2025-03-04 DIAGNOSIS — Z11.59 NEED FOR HEPATITIS C SCREENING TEST: ICD-10-CM

## 2025-03-04 LAB
ALBUMIN SERPL BCG-MCNC: 4.5 G/DL (ref 3.5–5)
ALP SERPL-CCNC: 68 U/L (ref 34–104)
ALT SERPL W P-5'-P-CCNC: 51 U/L (ref 7–52)
ANION GAP SERPL CALCULATED.3IONS-SCNC: 11 MMOL/L (ref 4–13)
AST SERPL W P-5'-P-CCNC: 29 U/L (ref 13–39)
BILIRUB SERPL-MCNC: 0.8 MG/DL (ref 0.2–1)
BUN SERPL-MCNC: 20 MG/DL (ref 5–25)
CALCIUM SERPL-MCNC: 10.3 MG/DL (ref 8.4–10.2)
CHLORIDE SERPL-SCNC: 102 MMOL/L (ref 96–108)
CHOLEST SERPL-MCNC: 194 MG/DL (ref ?–200)
CO2 SERPL-SCNC: 25 MMOL/L (ref 21–32)
CREAT SERPL-MCNC: 0.82 MG/DL (ref 0.6–1.3)
GFR SERPL CREATININE-BSD FRML MDRD: 106 ML/MIN/1.73SQ M
GLUCOSE P FAST SERPL-MCNC: 90 MG/DL (ref 65–99)
HCV AB SER QL: NORMAL
HDLC SERPL-MCNC: 43 MG/DL
LDLC SERPL CALC-MCNC: 136 MG/DL (ref 0–100)
NONHDLC SERPL-MCNC: 151 MG/DL
POTASSIUM SERPL-SCNC: 4.4 MMOL/L (ref 3.5–5.3)
PROT SERPL-MCNC: 7.3 G/DL (ref 6.4–8.4)
SODIUM SERPL-SCNC: 138 MMOL/L (ref 135–147)
TRIGL SERPL-MCNC: 77 MG/DL (ref ?–150)
TSH SERPL DL<=0.05 MIU/L-ACNC: 1.27 UIU/ML (ref 0.45–4.5)

## 2025-03-04 PROCEDURE — 84443 ASSAY THYROID STIM HORMONE: CPT

## 2025-03-04 PROCEDURE — 80053 COMPREHEN METABOLIC PANEL: CPT

## 2025-03-04 PROCEDURE — 99204 OFFICE O/P NEW MOD 45 MIN: CPT | Performed by: STUDENT IN AN ORGANIZED HEALTH CARE EDUCATION/TRAINING PROGRAM

## 2025-03-04 PROCEDURE — 86803 HEPATITIS C AB TEST: CPT

## 2025-03-04 PROCEDURE — 80061 LIPID PANEL: CPT

## 2025-03-04 PROCEDURE — 36415 COLL VENOUS BLD VENIPUNCTURE: CPT

## 2025-03-04 NOTE — ASSESSMENT & PLAN NOTE
Sending for sleep study - sleep apnea could be contributing to headaches and high blood pressure  Orders:    Ambulatory Referral to Sleep Medicine; Future

## 2025-03-04 NOTE — ASSESSMENT & PLAN NOTE
H/o migraine - has rizatriptan 10 mg at home from telehealth. Last used last week  Has medical marijuana prescription - gummies, was using for migraines (was getting migraines twice a week - now migraine twice a month)  Recent CTA head and neck unremarkable  Has appt with neurologist coming up 3/24

## 2025-03-05 ENCOUNTER — RESULTS FOLLOW-UP (OUTPATIENT)
Dept: FAMILY MEDICINE CLINIC | Facility: CLINIC | Age: 45
End: 2025-03-05

## 2025-03-06 ENCOUNTER — OFFICE VISIT (OUTPATIENT)
Dept: NEUROLOGY | Facility: CLINIC | Age: 45
End: 2025-03-06
Payer: COMMERCIAL

## 2025-03-06 ENCOUNTER — TELEPHONE (OUTPATIENT)
Dept: NEUROLOGY | Facility: CLINIC | Age: 45
End: 2025-03-06

## 2025-03-06 ENCOUNTER — TRANSCRIBE ORDERS (OUTPATIENT)
Dept: SLEEP CENTER | Facility: CLINIC | Age: 45
End: 2025-03-06

## 2025-03-06 VITALS
BODY MASS INDEX: 32.81 KG/M2 | SYSTOLIC BLOOD PRESSURE: 150 MMHG | TEMPERATURE: 98.3 F | WEIGHT: 234.4 LBS | HEIGHT: 71 IN | OXYGEN SATURATION: 98 % | HEART RATE: 78 BPM | DIASTOLIC BLOOD PRESSURE: 100 MMHG

## 2025-03-06 DIAGNOSIS — R06.83 SNORING: Primary | ICD-10-CM

## 2025-03-06 DIAGNOSIS — G43.009 MIGRAINE WITHOUT AURA AND WITHOUT STATUS MIGRAINOSUS, NOT INTRACTABLE: ICD-10-CM

## 2025-03-06 DIAGNOSIS — M54.81 OCCIPITAL NEURALGIA: Primary | ICD-10-CM

## 2025-03-06 PROCEDURE — 99204 OFFICE O/P NEW MOD 45 MIN: CPT

## 2025-03-06 RX ORDER — METHYLPREDNISOLONE 4 MG/1
TABLET ORAL
Qty: 1 EACH | Refills: 0 | Status: SHIPPED | OUTPATIENT
Start: 2025-03-06

## 2025-03-06 RX ORDER — RIZATRIPTAN BENZOATE 10 MG/1
10 TABLET ORAL AS NEEDED
COMMUNITY
End: 2025-03-06

## 2025-03-06 RX ORDER — RIZATRIPTAN BENZOATE 10 MG/1
10 TABLET, ORALLY DISINTEGRATING ORAL ONCE AS NEEDED
Qty: 18 TABLET | Refills: 3 | Status: SHIPPED | OUTPATIENT
Start: 2025-03-06 | End: 2025-03-07 | Stop reason: SDUPTHER

## 2025-03-06 NOTE — PATIENT INSTRUCTIONS
"Headache/migraine treatment:   - When you have a moderate to severe headache, you should seek rest, initiate relaxation and apply cold compresses to the head.      Abortive medications (for immediate treatment of a headache):   It is ok to take ibuprofen, acetaminophen or naproxen (Advil, Tylenol,  Aleve, Excedrin) if they help your headaches you should limit these to no more than 3 times a week to avoid medication overuse/rebound headaches.     Stop Ibuprofen   Instead complete Medrol dose rosalinda  Then can resume motrin 400 mg as needed for occipital neuralgia, no more than 3 times per week  Continue Rizatriptan 10 mg as needed for migraine      Over the counter preventive supplements for headaches/migraines   (to take every day to help prevent headaches - not to take at the time of headache):  [x] Magnesium 500 mg daily (If any diarrhea or upset stomach, decrease dose  as tolerated)  [x] Riboflavin (Vitamin B2) 400mg daily (FYI B2 may make your urine bright/neon yellow)     Prescription preventive medications for headaches/migraines   (to take every day to help prevent headaches - not to take at the time of headache):    Will defer at this time. If occipital neuralgia continues can consider Gabapentin as an option.     *Typically these types of medications take time untill you see the benefit, although some may see improvement in days, often it may take weeks, especially if the medication is being titrated up to a beneficial level. Please contact us if there are any concerns or questions regarding the medication.      Self-Monitoring:  [x] Headache calendar. Each day ranjit a number from 0-10 indicating if there was a headache and how bad it was.  This can be used to monitor gradual improvement and is helpful to make medication adjustments. You can do this on paper or there is an KIERRA for a smart phone called \"Migraine e Diary\".      Lifestyle Recommendations:  [x] SLEEP - Maintain a regular sleep schedule: Adults need " at least 7-8 hours of uninterrupted a night. Maintain good sleep hygiene:  Going to bed and waking up at consistent times, avoiding excessive daytime naps, avoiding caffeinated beverages in the evening, avoid excessive stimulation in the evening and generally using bed primarily for sleeping.  One hour before bedtime would recommend turning lights down lower, decreasing your activity (may read quietly, listen to music at a low volume). When you get into bed, should eliminate all technology (no texting, emailing, playing with your phone, iPad or tablet in bed).  [x] HYDRATION - Maintain good hydration.  Drink  2L of fluid a day (4 typical small water bottles)  [x] DIET - Maintain good nutrition. In particular don't skip meals and try and eat healthy balanced meals regularly.  [x] TRIGGERS - Look for other triggers and avoid them: Limit caffeine to 1-2 cups a day or less. Avoid dietary triggers that you have noticed bring on your headaches (this could include aged cheese, peanuts, MSG, aspartame and nitrates).  [x] EXERCISE - physical exercise as we all know is good for you in many ways, and not only is good for your heart, but also is beneficial for your mental health, cognitive health and  chronic pain/headaches. I would encourage at the least 5 days of physical exercise weekly for at least 30 minutes.      Education and Follow-up  [x] Please call with any questions or concerns. Of course if any new concerning symptoms go to the emergency department.  [x] Follow up in 3 months, sooner if needed  [x] Can consider physical therapy for your neck if occipital neuralgia continues

## 2025-03-06 NOTE — PROGRESS NOTES
Name: Lavell Garcia      : 1980      MRN: [id number]  Encounter Provider: MIESHA Joiner  Encounter Date: 3/6/2025   Encounter department: [facility] [place]  :  Assessment & Plan  Occipital neuralgia  Lavell Garcia is a 44 year old male with a hx of migraine headaches who developed bilateral occipital head pain that radiated upward very different than his usual migraines. He denies any associated migrainous symptoms. His exam is positive for tenderness with palpation of the lesser>greater occipital nerves consistent with occipital neuralgia. His pain is largely resolved at this time, with residual pain only noted with flexion of the neck or jostling of the neck while driving over bumps. I have recommended he complete a medrol dose rosalinda to take care of any inflammation/irritation of the occipital nerves. We discussed prn motrin once his steroid taper is complete, but warned about overuse due to risk of rebound headache. If despite medrol his occipital pain continues we will consider cervical xray and physical therapy. ONB can also be considered in the future if needed.    Orders:    Ambulatory Referral to Neurology    methylPREDNISolone 4 MG tablet therapy pack; Use as directed on package    Migraine without aura and without status migrainosus, not intractable  Lavell Garcia is a 44 year old male with migraine headaches x 20 years. They occur on average twice a month and are easily aborted with Rizatriptan ODT. He is obtaining Rizatriptan through an online company which is expensive. I have sent refills for this today and will continue to refill going forward to make it more affordable. He does not require a daily preventative medication given his current migraine frequency. He was encouraged to initiate vitamin supplementation. He will follow up in 3 months; sooner if needed.    Orders:    Ambulatory Referral to Neurology    rizatriptan (MAXALT-MLT) 10 mg  disintegrating tablet; Take 1 tablet (10 mg total) by mouth once as needed for migraine May repeat once in 2 hours if needed      Patient Instructions   Headache/migraine treatment:   - When you have a moderate to severe headache, you should seek rest, initiate relaxation and apply cold compresses to the head.      Abortive medications (for immediate treatment of a headache):   It is ok to take ibuprofen, acetaminophen or naproxen (Advil, Tylenol,  Aleve, Excedrin) if they help your headaches you should limit these to no more than 3 times a week to avoid medication overuse/rebound headaches.     Stop Ibuprofen   Instead complete Medrol dose rosalinda  Then can resume motrin 400 mg as needed for occipital neuralgia, no more than 3 times per week  Continue Rizatriptan 10 mg as needed for migraine      Over the counter preventive supplements for headaches/migraines   (to take every day to help prevent headaches - not to take at the time of headache):  [x] Magnesium 500 mg daily (If any diarrhea or upset stomach, decrease dose  as tolerated)  [x] Riboflavin (Vitamin B2) 400mg daily (FYI B2 may make your urine bright/neon yellow)     Prescription preventive medications for headaches/migraines   (to take every day to help prevent headaches - not to take at the time of headache):    Will defer at this time. If occipital neuralgia continues can consider Gabapentin as an option.     *Typically these types of medications take time untill you see the benefit, although some may see improvement in days, often it may take weeks, especially if the medication is being titrated up to a beneficial level. Please contact us if there are any concerns or questions regarding the medication.      Self-Monitoring:  [x] Headache calendar. Each day ranjit a number from 0-10 indicating if there was a headache and how bad it was.  This can be used to monitor gradual improvement and is helpful to make medication adjustments. You can do this on paper or  "there is an KIERRA for a smart phone called \"Migraine e Diary\".      Lifestyle Recommendations:  [x] SLEEP - Maintain a regular sleep schedule: Adults need at least 7-8 hours of uninterrupted a night. Maintain good sleep hygiene:  Going to bed and waking up at consistent times, avoiding excessive daytime naps, avoiding caffeinated beverages in the evening, avoid excessive stimulation in the evening and generally using bed primarily for sleeping.  One hour before bedtime would recommend turning lights down lower, decreasing your activity (may read quietly, listen to music at a low volume). When you get into bed, should eliminate all technology (no texting, emailing, playing with your phone, iPad or tablet in bed).  [x] HYDRATION - Maintain good hydration.  Drink  2L of fluid a day (4 typical small water bottles)  [x] DIET - Maintain good nutrition. In particular don't skip meals and try and eat healthy balanced meals regularly.  [x] TRIGGERS - Look for other triggers and avoid them: Limit caffeine to 1-2 cups a day or less. Avoid dietary triggers that you have noticed bring on your headaches (this could include aged cheese, peanuts, MSG, aspartame and nitrates).  [x] EXERCISE - physical exercise as we all know is good for you in many ways, and not only is good for your heart, but also is beneficial for your mental health, cognitive health and  chronic pain/headaches. I would encourage at the least 5 days of physical exercise weekly for at least 30 minutes.      Education and Follow-up  [x] Please call with any questions or concerns. Of course if any new concerning symptoms go to the emergency department.  [x] Follow up in 3 months, sooner if needed  [x] Can consider physical therapy for your neck if occipital neuralgia continues            History of Present Illness     Lavell Garcia is a 44 year old male with a hx of migraine headaches x 20 years.  He states he was in the ER 2/28/2025 because he developed a different " kind of headache- abrupt onset of pain in the back of the head and neck. He states he was treated with a muscle relaxer and had a CTA which was unremarkable. He states he does not have severe pain any longer but does still have some neck pain and pain in the back of the head that radiates upward specifically with flexing his head forward.     Description of Headaches:  Location of pain: bilateral, occipital (L>R), migraines are typically right sided   Radiation of pain?: upward to behind ears  Character of pain: aching, pressure  Severity of pain: current pain level 1-2/10  Accompanying symptoms:no associated symptoms with current headache, with migraines he does have nausea, and photophobia  Prodromal sx?: denies   Rapidity of onset: sudden  Typical duration of individual headache: intermittently noted throughout the day  Frequency of headaches: every day but not intense; migraines are on average twice a month   Are you ever headache free?no  Are most headaches similar in presentation? yes  Exacerbating factors: bending head forward, driving and hitting a bump, laying flat   Typical precipitants: at onset of new type of headache he was looking down, polishing cars x 3 hours    Temporal Pattern of Headaches:  Started having HA's migraines x 20 years, new type of headache 2/28  Worst time of day: random   Awaken from sleep?: no  Seasonal pattern?: no  'Clustering' of HA's over time? no  Overall pattern since problem began: gradually improving    Degree of Functional Impairment:  denies    Current Use of Meds to Treat HA:  Abortive meds? Tylenol- ineffective, Ibuprofen 400 mg as needed does help, Rizatriptan 10 mg for migraines   Daily use? Twice a day every day x 1 week   Preventive meds?  Denies   Non-Medical/Alternative treatments for HA: yes - wearing a neck brace while watching TV helps, ice makes it feel better, no difference with applying hear  Also taking magnesium 400 mg qhs  Vitamin D daily  5 mg THC gummy  edible    Additional Relevant History:  History of head/neck trauma? no  History of head/neck surgery? no  Family h/o headache problems?yes - dad, sister, maternal grandmother-all have migraines   Family history of aneurysms? no  Exposure to carbon monoxide? no  Substance use: alcohol: denies, illicit drugs: denies, tobacco: denies, caffeine: cut back from 60 oz to 20 oz of coffee daily recently   Water: 64 oz water+ daily  Diet: has cut sugars from his diet; he does skip meals often  Sleep: 6-8 hours a night  Vitamins: multivitamin, magnesium 400 mg qhs and Vitamin D daily  Mood: denies anxiety or depression   Work: , computer work. Day time hours    Prior Evaluation/Treatments:  Have you seen another physician for your headaches? no  Prior work-up: CTA head and neck  Trigger point injections? no  Botox injections? no  Epidural injections? no  Prior PREVENTATIVE medications:  denies   Prior ABORTIVE mediations: acetaminophen, NSAIDs (motrin)    HPI   Review of Systems   Constitutional:  Negative for appetite change, fatigue and fever.   HENT: Negative.  Negative for hearing loss, tinnitus, trouble swallowing and voice change.    Eyes:  Positive for photophobia (onset). Negative for pain and visual disturbance.   Respiratory: Negative.  Negative for shortness of breath.    Cardiovascular: Negative.  Negative for palpitations.   Gastrointestinal:  Positive for nausea (onset). Negative for vomiting.   Endocrine: Negative.  Negative for cold intolerance.   Genitourinary: Negative.  Negative for dysuria, frequency and urgency.   Musculoskeletal:  Negative for back pain, gait problem, myalgias, neck pain and neck stiffness.   Skin: Negative.  Negative for rash.   Allergic/Immunologic: Negative.    Neurological:  Positive for headaches (right side). Negative for dizziness, tremors, seizures, syncope, facial asymmetry, speech difficulty, weakness, light-headedness and numbness.   Hematological: Negative.   "Does not bruise/bleed easily.   Psychiatric/Behavioral: Negative.  Negative for confusion, hallucinations and sleep disturbance.    All other systems reviewed and are negative.    I have personally reviewed the MA's review of systems and made changes as necessary.    Current Outpatient Medications on File Prior to Visit   Medication Sig Dispense Refill    amLODIPine (NORVASC) 2.5 mg tablet Take 2 tablets (5 mg total) by mouth daily 60 tablet 0    methocarbamol (ROBAXIN) 500 mg tablet Take 1 tablet (500 mg total) by mouth 2 (two) times a day as needed for muscle spasms (occipital headache) for up to 5 days 10 tablet 0     No current facility-administered medications on file prior to visit.         Objective   /100 (BP Location: Right arm, Patient Position: Sitting, Cuff Size: Standard)   Pulse 78   Temp 98.3 °F (36.8 °C) (Temporal)   Ht 5' 11\" (1.803 m)   Wt 106 kg (234 lb 6.4 oz)   SpO2 98%   BMI 32.69 kg/m²     Physical Exam  Vitals reviewed.   Constitutional:       Appearance: Normal appearance.   HENT:      Head: Normocephalic and atraumatic.      Nose: Nose normal.      Mouth/Throat:      Mouth: Mucous membranes are moist.      Pharynx: Oropharynx is clear.   Eyes:      Extraocular Movements: Extraocular movements intact.      Conjunctiva/sclera: Conjunctivae normal.      Pupils: Pupils are equal, round, and reactive to light.   Cardiovascular:      Rate and Rhythm: Normal rate.      Pulses: Normal pulses.   Pulmonary:      Effort: Pulmonary effort is normal.   Musculoskeletal:         General: Normal range of motion.      Cervical back: Normal range of motion and neck supple. Tenderness (bilateral less occipital nerve tenderness with palpation) present.      Right lower leg: No edema.      Left lower leg: No edema.   Skin:     General: Skin is warm and dry.   Neurological:      General: No focal deficit present.      Mental Status: He is alert and oriented to person, place, and time.      Cranial " Nerves: No cranial nerve deficit.      Sensory: No sensory deficit.      Motor: No weakness.      Coordination: Coordination normal.      Gait: Gait normal.      Deep Tendon Reflexes: Reflexes normal.   Psychiatric:         Mood and Affect: Mood normal.         Behavior: Behavior normal.         Thought Content: Thought content normal.       Neurological Exam  On neurological examination patient is alert, awake, oriented and in no distress. Speech is fluent without dysarthria or aphasia. Cranial nerves 2-12 were symmetrically intact bilaterally.  No evidence of any focal weakness or sensory loss in the upper or lower extremities. Motor testing reveals 5/5 strength of the bilateral upper and lower extremities.There was no pronator drift.  No fasciculations present. No abnormal involuntary movements. Finger- to-nose reveals no tremor or ataxia and intact proprioceptive function, no dysmetria was noted. Rapid alternating movement normal. Sensation was intact to vibration, light touch, and temperature in bilateral upper and lower extremities. Deep tendon reflexes were 2+ and symmetric in the bilateral upper and lower extremities. He is able to rise easily without assistance from a seated position. Casual gait is normal including stance, stride, and arm swing. Normal tandem gait. Romberg is absent.There is tenderness on palpation of the greater<lesser occipital nerves bilaterally.        Administrative Statements   I have spent a total time of 60 minutes in caring for this patient on the day of the visit/encounter including Diagnostic results, Prognosis, Risks and benefits of tx options, Instructions for management, Patient and family education, Importance of tx compliance, Risk factor reductions, Impressions, Counseling / Coordination of care, Documenting in the medical record, Reviewing/placing orders in the medical record (including tests, medications, and/or procedures), and Obtaining or reviewing history  .

## 2025-03-06 NOTE — ASSESSMENT & PLAN NOTE
Orders:    rizatriptan (MAXALT-MLT) 10 mg disintegrating tablet; Take 1 tablet (10 mg total) by mouth once as needed for migraine May repeat once in 2 hours if needed

## 2025-03-07 DIAGNOSIS — G43.009 MIGRAINE WITHOUT AURA AND WITHOUT STATUS MIGRAINOSUS, NOT INTRACTABLE: ICD-10-CM

## 2025-03-07 RX ORDER — RIZATRIPTAN BENZOATE 10 MG/1
10 TABLET, ORALLY DISINTEGRATING ORAL ONCE AS NEEDED
Qty: 18 TABLET | Refills: 3 | Status: SHIPPED | OUTPATIENT
Start: 2025-03-07

## 2025-03-07 NOTE — ASSESSMENT & PLAN NOTE
Lavell Garcia is a 44 year old male with a hx of migraine headaches who developed bilateral occipital head pain that radiated upward very different than his usual migraines. He denies any associated migrainous symptoms. His exam is positive for tenderness with palpation of the lesser>greater occipital nerves consistent with occipital neuralgia. His pain is largely resolved at this time, with residual pain only noted with flexion of the neck or jostling of the neck while driving over bumps. I have recommended he complete a medrol dose rosalinda to take care of any inflammation/irritation of the occipital nerves. We discussed prn motrin once his steroid taper is complete, but warned about overuse due to risk of rebound headache. If despite medrol his occipital pain continues we will consider cervical xray and physical therapy. ONB can also be considered in the future if needed.    Orders:    Ambulatory Referral to Neurology    methylPREDNISolone 4 MG tablet therapy pack; Use as directed on package

## 2025-03-07 NOTE — TELEPHONE ENCOUNTER
Patient said pharmacy never received prescription please resend    Reason for call:   [x] Refill   [] Prior Auth  [] Other:     Office:   [] PCP/Provider -   [x] Specialty/Provider - neuro/Fritz    Medication: Rizatriptan 10mg ODT    Dose/Frequency: 1 tab prn for migraine    Quantity: 18    Pharmacy: [id number] - [place], [place] - [address] [phone]     Local Pharmacy   Does the patient have enough for 3 days?   [] Yes   [x] No - Send as HP to POD

## 2025-03-07 NOTE — ASSESSMENT & PLAN NOTE
Lavell Garcia is a 44 year old male with migraine headaches x 20 years. They occur on average twice a month and are easily aborted with Rizatriptan ODT. He is obtaining Rizatriptan through an online company which is expensive. I have sent refills for this today and will continue to refill going forward to make it more affordable. He does not require a daily preventative medication given his current migraine frequency. He was encouraged to initiate vitamin supplementation. He will follow up in 3 months; sooner if needed.    Orders:    Ambulatory Referral to Neurology    rizatriptan (MAXALT-MLT) 10 mg disintegrating tablet; Take 1 tablet (10 mg total) by mouth once as needed for migraine May repeat once in 2 hours if needed

## 2025-03-20 DIAGNOSIS — I10 HYPERTENSION: ICD-10-CM

## 2025-03-20 RX ORDER — AMLODIPINE BESYLATE 5 MG/1
5 TABLET ORAL DAILY
Qty: 30 TABLET | Refills: 0 | Status: SHIPPED | OUTPATIENT
Start: 2025-03-20

## 2025-03-20 RX ORDER — AMLODIPINE BESYLATE 2.5 MG/1
5 TABLET ORAL DAILY
Qty: 60 TABLET | Refills: 1 | Status: CANCELLED | OUTPATIENT
Start: 2025-03-20

## 2025-04-03 ENCOUNTER — HOSPITAL ENCOUNTER (OUTPATIENT)
Dept: SLEEP CENTER | Facility: CLINIC | Age: 45
Discharge: HOME | End: 2025-04-03

## 2025-04-03 DIAGNOSIS — R06.83 SNORING: ICD-10-CM

## 2025-04-03 NOTE — PROGRESS NOTES
Home Sleep Study Documentation    HOME STUDY DEVICE: Noxturnal no                                           Dora G3 yes device # 8      Pre-Sleep Home Study:    Set-up and instructions performed by: Munira    Technician performed demonstration for Patient: yes    Return demonstration performed by Patient: yes    Written instructions provided to Patient: yes    Patient signed consent form: yes        Post-Sleep Home Study:    Additional comments by Patient: pending    Home Sleep Study Failed:pending    Failure reason: pending    Reported or Detected: pending    Scored by: pending

## 2025-04-04 DIAGNOSIS — I10 HYPERTENSION: ICD-10-CM

## 2025-04-04 RX ORDER — AMLODIPINE BESYLATE 5 MG/1
5 TABLET ORAL DAILY
Qty: 30 TABLET | Refills: 5 | Status: SHIPPED | OUTPATIENT
Start: 2025-04-04

## 2025-04-14 PROBLEM — G47.33 OSA (OBSTRUCTIVE SLEEP APNEA): Status: ACTIVE | Noted: 2025-04-14

## 2025-04-15 ENCOUNTER — RESULTS FOLLOW-UP (OUTPATIENT)
Dept: FAMILY MEDICINE CLINIC | Facility: CLINIC | Age: 45
End: 2025-04-15

## 2025-04-15 ENCOUNTER — OFFICE VISIT (OUTPATIENT)
Dept: FAMILY MEDICINE CLINIC | Facility: CLINIC | Age: 45
End: 2025-04-15
Payer: COMMERCIAL

## 2025-04-15 VITALS
WEIGHT: 230.2 LBS | BODY MASS INDEX: 32.23 KG/M2 | DIASTOLIC BLOOD PRESSURE: 90 MMHG | HEART RATE: 69 BPM | RESPIRATION RATE: 17 BRPM | HEIGHT: 71 IN | OXYGEN SATURATION: 99 % | SYSTOLIC BLOOD PRESSURE: 138 MMHG | TEMPERATURE: 97.2 F

## 2025-04-15 DIAGNOSIS — I10 PRIMARY HYPERTENSION: ICD-10-CM

## 2025-04-15 DIAGNOSIS — G47.33 OSA (OBSTRUCTIVE SLEEP APNEA): ICD-10-CM

## 2025-04-15 DIAGNOSIS — Z00.00 ANNUAL PHYSICAL EXAM: Primary | ICD-10-CM

## 2025-04-15 DIAGNOSIS — Z23 ENCOUNTER FOR IMMUNIZATION: ICD-10-CM

## 2025-04-15 DIAGNOSIS — Z12.11 COLON CANCER SCREENING: ICD-10-CM

## 2025-04-15 PROCEDURE — 90715 TDAP VACCINE 7 YRS/> IM: CPT | Performed by: STUDENT IN AN ORGANIZED HEALTH CARE EDUCATION/TRAINING PROGRAM

## 2025-04-15 PROCEDURE — 99396 PREV VISIT EST AGE 40-64: CPT | Performed by: STUDENT IN AN ORGANIZED HEALTH CARE EDUCATION/TRAINING PROGRAM

## 2025-04-15 PROCEDURE — 90471 IMMUNIZATION ADMIN: CPT | Performed by: STUDENT IN AN ORGANIZED HEALTH CARE EDUCATION/TRAINING PROGRAM

## 2025-04-15 NOTE — PATIENT INSTRUCTIONS
"Patient Education     Routine physical for adults   The Basics   Written by the doctors and editors at [facility]   What is a physical? -- A physical is a routine visit, or \"check-up,\" with your doctor. You might also hear it called a \"wellness visit\" or \"preventive visit.\"  During each visit, the doctor will:   Ask about your physical and mental health   Ask about your habits, behaviors, and lifestyle   Do an exam   Give you vaccines if needed   Talk to you about any medicines you take   Give advice about your health   Answer your questions  Getting regular check-ups is an important part of taking care of your health. It can help your doctor find and treat any problems you have. But it's also important for preventing health problems.  A routine physical is different from a \"sick visit.\" A sick visit is when you see a doctor because of a health concern or problem. Since physicals are scheduled ahead of time, you can think about what you want to ask the doctor.  How often should I get a physical? -- It depends on your age and health. In general, for people age 21 years and older:   If you are younger than 50 years, you might be able to get a physical every 3 years.   If you are 50 years or older, your doctor might recommend a physical every year.  If you have an ongoing health condition, like diabetes or high blood pressure, your doctor will probably want to see you more often.  What happens during a physical? -- In general, each visit will include:   Physical exam - The doctor or nurse will check your height, weight, heart rate, and blood pressure. They will also look at your eyes and ears. They will ask about how you are feeling and whether you have any symptoms that bother you.   Medicines - It's a good idea to bring a list of all the medicines you take to each doctor visit. Your doctor will talk to you about your medicines and answer any questions. Tell them if you are having any side effects that bother you. You " "should also tell them if you are having trouble paying for any of your medicines.   Habits and behaviors - This includes:   Your diet   Your exercise habits   Whether you smoke, drink alcohol, or use drugs   Whether you are sexually active   Whether you feel safe at home  Your doctor will talk to you about things you can do to improve your health and lower your risk of health problems. They will also offer help and support. For example, if you want to quit smoking, they can give you advice and might prescribe medicines. If you want to improve your diet or get more physical activity, they can help you with this, too.   Lab tests, if needed - The tests you get will depend on your age and situation. For example, your doctor might want to check your:   Cholesterol   Blood sugar   Iron level   Vaccines - The recommended vaccines will depend on your age, health, and what vaccines you already had. Vaccines are very important because they can prevent certain serious or deadly infections.   Discussion of screening - \"Screening\" means checking for diseases or other health problems before they cause symptoms. Your doctor can recommend screening based on your age, risk, and preferences. This might include tests to check for:   Cancer, such as breast, prostate, cervical, ovarian, colorectal, prostate, lung, or skin cancer   Sexually transmitted infections, such as chlamydia and gonorrhea   Mental health conditions like depression and anxiety  Your doctor will talk to you about the different types of screening tests. They can help you decide which screenings to have. They can also explain what the results might mean.   Answering questions - The physical is a good time to ask the doctor or nurse questions about your health. If needed, they can refer you to other doctors or specialists, too.  Adults older than 65 years often need other care, too. As you get older, your doctor will talk to you about:   How to prevent falling at " home   Hearing or vision tests   Memory testing   How to take your medicines safely   Making sure that you have the help and support you need at home  All topics are updated as new evidence becomes available and our peer review process is complete.  This topic retrieved from [email or web address] on: May 02, 2024.  Topic 355682 Version 1.0  Release: 32.4.3 - C32.122  © 2024 UpToDate, Inc. and/or its affiliates. All rights reserved.  Consumer Information Use and Disclaimer   Disclaimer: This generalized information is a limited summary of diagnosis, treatment, and/or medication information. It is not meant to be comprehensive and should be used as a tool to help the user understand and/or assess potential diagnostic and treatment options. It does NOT include all information about conditions, treatments, medications, side effects, or risks that may apply to a specific patient. It is not intended to be medical advice or a substitute for the medical advice, diagnosis, or treatment of a health care provider based on the health care provider's examination and assessment of a patient's specific and unique circumstances. Patients must speak with a health care provider for complete information about their health, medical questions, and treatment options, including any risks or benefits regarding use of medications. This information does not endorse any treatments or medications as safe, effective, or approved for treating a specific patient. UpToDate, Inc. and its affiliates disclaim any warranty or liability relating to this information or the use thereof.The use of this information is governed by the Terms of Use, available at https://www.woltersRelinkLabsuwer.com/en/know/clinical-effectiveness-terms. 2024© UpToDate, Inc. and its affiliates and/or licensors. All rights reserved.  Copyright   © 2024 UpToDate, Inc. and/or its affiliates. All rights reserved.

## 2025-04-15 NOTE — PROGRESS NOTES
Adult Annual Physical  Name: Lavell Garcia      : 1980      MRN: [id number]  Encounter Provider: Lisseth Ballard DO  Encounter Date: 4/15/2025   Encounter department: [facility] [place]    Assessment & Plan  Annual physical exam         Primary hypertension  Continue amlodipine 5 mg daily       Colon cancer screening  Reviewed colon cancer screening options in detail. Patient asymptomatic, no family history of colon cancer. Pt opts for cologuard - ordered today  Orders:    Cologuard    Encounter for immunization  Tdap today - provided vaccine counseling  Orders:    TDAP VACCINE GREATER THAN OR EQUAL TO 8YO IM    ZO (obstructive sleep apnea)  Recent sleep study with severe ZO - to see sleep specialist for CPAP         Preventive Screenings:  - Diabetes Screening: screening up-to-date  - Cholesterol Screening: has hyperlipidemia   - Hepatitis C screening: screening up-to-date   - Colon cancer screening: orders placed and risks/benefits discussed   - Lung cancer screening: screening not indicated   - Prostate cancer screening: risks/benefits discussed and patient declines     No fhx prostate ca. Pt asymptomatic. We reviewed screening guidelines in detail and will plan to wait until closer to 51 yo for PSA screening     Immunizations:  - Immunizations due: Influenza    Counseling/Anticipatory Guidance:  - Alcohol: discussed moderation in alcohol intake and recommendations for healthy alcohol use.   - Drug use: discussed harms of illicit drug use and how it can negatively impact mental/physical health.   - Tobacco use: discussed harms of tobacco use and management options for quitting.   - Dental health: discussed importance of regular tooth brushing, flossing, and dental visits.   - Diet: discussed recommendations for a healthy/well-balanced diet.   - Exercise: the importance of regular exercise/physical activity was discussed. Recommend exercise 3-5 times per week for at least 30 minutes.   -  Injury prevention: discussed safety/seat belts, safety helmets, smoke detectors, carbon monoxide detectors, and smoking near bedding or upholstery.          History of Present Illness       Chief Complaint   Patient presents with    Annual Exam       BP is well controlled on amlodipine 5 mg daily - reviewed home BP lo-120/70-80's, overall feeling better since starting BP medication. No adverse effects  Reports did home stool test last year (bought it himself), negative/normal  Sees derm, last visit 2 weeks ago - had AK's frozen on ears  Did not have flu shot this past season  Due for Tdap - today   Feeling well in usual state of health without acute concerns/symptoms    Adult Annual Physical:  Patient presents for annual physical.     Diet and Physical Activity:  - Diet/Nutrition:. B: yogurt. L: sandwich or snack. D: pasta, protein (chicken fish turkey; not much red meat), vegetables. likes cheese, dairy products. cut back on takeout  - Exercise: walking and 3-4 times a week on average.    General Health:  - Sleep: sleeps well.  - Hearing: normal hearing bilateral ears.  - Vision: no vision problems.  - Dental: regular dental visits.    Review of Systems   Constitutional:  Negative for chills, fatigue and fever.   HENT:  Negative for congestion and sore throat.    Eyes:  Negative for visual disturbance.   Respiratory:  Negative for cough, shortness of breath and wheezing.    Cardiovascular:  Negative for chest pain and palpitations.   Gastrointestinal:  Negative for abdominal pain, blood in stool, constipation, diarrhea, nausea and vomiting.   Endocrine: Negative for cold intolerance and heat intolerance.   Genitourinary:  Negative for decreased urine volume and difficulty urinating.   Musculoskeletal:  Negative for arthralgias and myalgias.   Skin:  Negative for rash.   Neurological:  Negative for dizziness, weakness, light-headedness and headaches.   Psychiatric/Behavioral:  Negative for dysphoric mood and  "sleep disturbance.          Objective   /90 (BP Location: Left arm, Patient Position: Sitting, Cuff Size: Large)   Pulse 69   Temp (!) 97.2 °F (36.2 °C) (Temporal)   Resp 17   Ht 5' 11\" (1.803 m)   Wt 104 kg (230 lb 3.2 oz)   SpO2 99%   BMI 32.11 kg/m²     Physical Exam  Vitals and nursing note reviewed.   Constitutional:       General: He is not in acute distress.     Appearance: Normal appearance. He is well-developed. He is not ill-appearing.   HENT:      Head: Normocephalic and atraumatic.      Right Ear: Tympanic membrane and ear canal normal.      Left Ear: Tympanic membrane and external ear normal.      Nose: Nose normal.      Mouth/Throat:      Mouth: Mucous membranes are moist.      Pharynx: Oropharynx is clear. No posterior oropharyngeal erythema.   Eyes:      Extraocular Movements: Extraocular movements intact.      Conjunctiva/sclera: Conjunctivae normal.   Cardiovascular:      Rate and Rhythm: Normal rate and regular rhythm.      Heart sounds: No murmur heard.  Pulmonary:      Effort: Pulmonary effort is normal. No respiratory distress.      Breath sounds: Normal breath sounds. No wheezing or rales.   Abdominal:      General: Bowel sounds are normal.      Palpations: Abdomen is soft.      Tenderness: There is no abdominal tenderness.   Musculoskeletal:      Cervical back: Neck supple.      Right lower leg: No edema.      Left lower leg: No edema.   Lymphadenopathy:      Cervical: No cervical adenopathy.   Skin:     General: Skin is warm and dry.      Capillary Refill: Capillary refill takes less than 2 seconds.   Neurological:      General: No focal deficit present.      Mental Status: He is alert. Mental status is at baseline.   Psychiatric:         Mood and Affect: Mood normal.         Behavior: Behavior normal.         "

## 2025-05-04 LAB — COLOGUARD RESULT REPORTABLE: NEGATIVE

## 2025-05-16 ENCOUNTER — OFFICE VISIT (OUTPATIENT)
Dept: URGENT CARE | Age: 45
End: 2025-05-16
Payer: COMMERCIAL

## 2025-05-16 VITALS
HEIGHT: 71 IN | RESPIRATION RATE: 20 BRPM | OXYGEN SATURATION: 99 % | DIASTOLIC BLOOD PRESSURE: 100 MMHG | SYSTOLIC BLOOD PRESSURE: 130 MMHG | HEART RATE: 55 BPM | TEMPERATURE: 96.5 F | BODY MASS INDEX: 32.11 KG/M2

## 2025-05-16 DIAGNOSIS — H60.542 DERMATITIS OF LEFT EAR CANAL: Primary | ICD-10-CM

## 2025-05-16 PROCEDURE — 99213 OFFICE O/P EST LOW 20 MIN: CPT

## 2025-05-16 RX ORDER — TRIAMCINOLONE ACETONIDE 5 MG/G
CREAM TOPICAL 3 TIMES DAILY
Qty: 15 G | Refills: 0 | Status: SHIPPED | OUTPATIENT
Start: 2025-05-16

## 2025-05-16 RX ORDER — NEOMYCIN SULFATE, POLYMYXIN B SULFATE AND HYDROCORTISONE 10; 3.5; 1 MG/ML; MG/ML; [USP'U]/ML
4 SUSPENSION/ DROPS AURICULAR (OTIC) EVERY 8 HOURS SCHEDULED
Qty: 4.2 ML | Refills: 0 | Status: SHIPPED | OUTPATIENT
Start: 2025-05-16 | End: 2025-05-23

## 2025-05-16 NOTE — PATIENT INSTRUCTIONS
Please begin topical steroids/antibiotics as directed.   Follow up with Dermatology next month as planned.

## 2025-05-16 NOTE — PROGRESS NOTES
"[facility]  Name: Lavell Garcia      : 1980      MRN: [id number]  Encounter Provider: MIESHA Paz  Encounter Date: 2025   [facility]  :  Please begin topical steroids/antibiotics as directed.   Follow up with Dermatology next month as planned.   Assessment & Plan  Dermatitis of left ear canal    Orders:    neomycin-polymyxin-hydrocortisone (CORTISPORIN) 0.35%-10,000 units/mL-1% otic suspension; Administer 4 drops into the left ear every 8 (eight) hours for 7 days    triamcinolone (KENALOG) 0.5 % cream; Apply topically 3 (three) times a day        Patient Instructions  Patient Education     Eczema (atopic dermatitis)   The Basics   Written by the doctors and editors at [facility]   What is eczema? -- Eczema is a skin condition that makes your skin itchy and flaky. Doctors do not know what causes it. Eczema often happens in people who have allergies. It can also run in families. Another term for eczema is \"atopic dermatitis.\"  What are the symptoms of eczema? -- The symptoms of eczema can include:   Intense itching   Color changes - In people with light skin, areas with eczema might look red or pink. In people with dark skin, they might appear dark brown, purple, or gray. Sometimes, there is a patch of skin that looks lighter than the skin around it.   Small bumps - These might look like dots or goosebumps (picture 1).   Skin that flakes off or forms scales (picture 2)  Most people with eczema have their first symptoms before they turn 5. But eczema can look different in people of different ages:   In babies and children younger than 2 years old, eczema tends to affect the front of the arms and legs, cheeks, or scalp (picture 3). (The diaper area is not usually affected.)   In older children andadults, eczema often affects the sides of the neck, the elbow creases, and the backs of the knees (picture 4). Adults can also get it on their wrists, " hands, forearms, and face (picture 5).   In older children and adults, the skin can become thicker over time (picture 6), and can even form scars from too much scratching.  Is there a test for eczema? -- No, there is no test. But doctors and nurses can tell if you have eczema by looking at your skin and by asking you questions.  What can I do to reduce my symptoms? -- You can use unscented, thick moisturizing creams and ointments to keep the skin from getting too dry.  If possible, try to avoid or limit things that can make eczema worse. These include:   Being too hot or sweating too much   Being in very dry air   Stress or worry   Sudden temperature changes   Harsh soaps or cleaning products   Perfumes   Wool or synthetic fabrics (like polyester)  How is eczema treated? -- There are treatments that can relieve the symptoms of eczema. But the condition cannot be cured. Even so, about half of children with eczema grow out of it by the time they become adults. Treatments for eczema include:   Moisturizing creams or ointments - These products help keep your skin moist. In some cases, your doctor or nurse might suggest using a moist dressing over special creams or medicines. It helps to put on your cream or ointment right after a bath or shower. Some people also try products that you put in the bathtub, such as oil or oatmeal. But these have been found not to help with eczema symptoms.   Steroid creams and ointments - These can help with itching and swelling. In severe cases, you might need steroids in pills. But your doctor or nurse will want you to stop taking steroid pills as soon as possible. Even though these medicines help, they can also cause problems of their own.   Antihistamine pills - Antihistamines are medicines that people often take for allergies. Some people with eczema find that antihistamines relieve itching. Others do not think that the medicines help with itching. Many people with eczema find that  "itching is worst at night. That can make it hard to sleep. If you have this problem, talk with your doctor or nurse about it. They might recommend an antihistamine that can also help you sleep.   Light therapy - Another treatment option is something called \"light therapy,\" but doctors do not use it much. During light therapy, your skin is exposed to a special kind of light called ultraviolet light. This therapy is usually done in a doctor's office.  Doctors usually recommend light therapy for people who do not get better with other treatments.   Medicines that change the way that the immune system works - These medicines are only for people who do not get better with moisturizers and steroid creams or ointments.  Can eczema be prevented? -- Experts don't know if there is a way to prevent eczema. Babies who have a parent or sibling with eczema have a higher risk of getting it. For these babies, good skin care might be helpful, especially in cold or dry areas. Good skin care includes using moisturizing creams or ointments. But doctors don't yet know if this actually helps prevent eczema from happening later.  If you use cream or ointment on your , wash your hands first. This helps lower the risk of getting germs on the baby's skin that could cause infection. Try to use products that come in a tube instead of a jar that you dip your fingers in.  When should I call the doctor? -- Call for emergency help right away (in the [place] and [place], call ) if:   You have signs of a very bad allergic reaction called \"anaphylaxis.\" These include:   Hives - Raised, inflamed, red patches of skin that are very itchy.   Angioedema - A condition that causes puffiness, usually of the face, eyelids, ears, mouth, hands, or feet.   Redness or itching of the skin on most of the body (without hives)   Swelling or itching of the eyes   Runny nose or swelling of the tongue   Trouble breathing, wheezing, or voice changes   Vomiting or " diarrhea   Feeling dizzy or passing out  Call for advice if:   You have signs of an infection - These include a fever of 100.4°F (38°C) or higher, or chills.   Your eczema is making you feel anxious or depressed - There are treatments that can help with this.   You have trouble sleeping because you are itching.   Your eczema:   Has pus or yellow scabs on it   Gets worse or is covering most of your body   Is on your eyes or lips, or if you notice a rash or blisters in your mouth   Gets worse after you were around someone with cold sores or fever blisters  All topics are updated as new evidence becomes available and our peer review process is complete.  This topic retrieved from [employer] on: Feb 26, 2024.  Topic 70112 Version 19.0  Release: 32.2.4 - C32.56  © 2024 UpToDate, Inc. and/or its affiliates. All rights reserved.  picture 1: Eczema     Eczema sometimes looks like scaly bumps on the skin.  Graphic 321258 Version 1.0  picture 2: Dry skin in eczema     This child has dry skin from eczema on their chest and arms.  Graphic [id number] Version 1.0  picture 3: Baby with eczema     This picture shows a baby with eczema on the cheeks and neck.  Graphic [id number] Version 2.0  picture 4: Child with eczema     This picture shows eczema on the back of a child's legs. In some areas, the skin has been damaged by repeated scratching.  Graphic [id number] Version 3.0  picture 5: Eczema affecting the eyelids     This picture shows a person with red, scaly skin from eczema on the eyelids.  Graphic [id number] Version 2.0  picture 6: Skin thickening in eczema     Over time, eczema can lead to thickening of the skin.  Graphic [id number] Version 1.0  Consumer Information Use and Disclaimer   Disclaimer: This generalized information is a limited summary of diagnosis, treatment, and/or medication information. It is not meant to be comprehensive and should be used as a tool to help the user understand and/or assess potential diagnostic and treatment  options. It does NOT include all information about conditions, treatments, medications, side effects, or risks that may apply to a specific patient. It is not intended to be medical advice or a substitute for the medical advice, diagnosis, or treatment of a health care provider based on the health care provider's examination and assessment of a patient's specific and unique circumstances. Patients must speak with a health care provider for complete information about their health, medical questions, and treatment options, including any risks or benefits regarding use of medications. This information does not endorse any treatments or medications as safe, effective, or approved for treating a specific patient. UpToDate, Inc. and its affiliates disclaim any warranty or liability relating to this information or the use thereof.The use of this information is governed by the Terms of Use, available at https://www.[employer]/en/know/clinical-effectiveness-terms. 2024© UpToDate, Inc. and its affiliates and/or licensors. All rights reserved.  Copyright   Follow up with PCP in 3-5 days.  Proceed to  ER if symptoms worsen.    If tests are performed, our office will contact you with results only if changes need to made to the care plan discussed with you at the visit. You can review your full results on [facility] MyChart.    Chief Complaint:   Chief Complaint   Patient presents with    Earache     Feels like the entrance and inside of his ear canal is swelling, has been going on for about a week. Slightly painful, 3/10 on the pain scale. Irritated and dry.      History of Present Illness   Patient is a 45 year old male with PMH significant for eczema, HTN, who presents for itching, swelling and redness of left ear over the past week. He notes that he had a similar episode last month which seemed to resolve without intervention. He has been applying Aquafor without relief. He denies ear pain, decreased hearing, tinnitus,  otorrhea.           Review of Systems   Constitutional:  Negative for fatigue and fever.   HENT:  Negative for congestion, ear discharge, ear pain, postnasal drip, rhinorrhea, sinus pressure, sinus pain, sneezing and sore throat.    Eyes: Negative.  Negative for pain, discharge, redness and itching.   Respiratory: Negative.  Negative for apnea, cough, choking, chest tightness, shortness of breath, wheezing and stridor.    Cardiovascular: Negative.  Negative for chest pain and palpitations.   Gastrointestinal: Negative.  Negative for diarrhea, nausea and vomiting.   Endocrine: Negative.  Negative for polydipsia, polyphagia and polyuria.   Genitourinary: Negative.  Negative for decreased urine volume and flank pain.   Musculoskeletal: Negative.  Negative for arthralgias, back pain, myalgias, neck pain and neck stiffness.   Skin:  Positive for color change and rash. Negative for pallor and wound.   Allergic/Immunologic: Negative.  Negative for environmental allergies.   Neurological: Negative.  Negative for dizziness, facial asymmetry, light-headedness, numbness and headaches.   Hematological: Negative.  Negative for adenopathy.   Psychiatric/Behavioral: Negative.       Past Medical History   No past medical history on file.  No past surgical history on file.  Family History   Problem Relation Age of Onset    Hypertension Father 20    Hypertension Sister     Ovarian cancer Maternal Grandmother 55    Hypertension Maternal Grandfather     Hypertension Paternal Grandmother     Hypertension Paternal Grandfather     Cancer Paternal Grandfather         lung     he reports that he has quit smoking. His smoking use included cigarettes. He has never used smokeless tobacco. He reports that he does not currently use alcohol. He reports that he does not currently use drugs.  Current Outpatient Medications   Medication Instructions    amLODIPine (NORVASC) 5 mg, Oral, Daily    neomycin-polymyxin-hydrocortisone (CORTISPORIN)  "0.35%-10,000 units/mL-1% otic suspension 4 drops, Left Ear, Every 8 hours scheduled    rizatriptan (MAXALT-MLT) 10 mg, Oral, Once as needed, May repeat once in 2 hours if needed    triamcinolone (KENALOG) 0.5 % cream Topical, 3 times daily   Allergies[1]     Objective   BP (!) 151/101 (BP Location: Left arm, Patient Position: Sitting, Cuff Size: Adult)   Pulse 55   Temp (!) 96.5 °F (35.8 °C) (Tympanic)   Resp 20   Ht 5' 11\" (1.803 m)   SpO2 99%   BMI 32.11 kg/m²      Physical Exam  Vitals and nursing note reviewed.   Constitutional:       General: He is not in acute distress.     Appearance: He is well-developed. He is not ill-appearing, toxic-appearing or diaphoretic.      Interventions: He is not intubated.  HENT:      Head: Normocephalic and atraumatic.        Right Ear: Hearing, tympanic membrane, ear canal and external ear normal. Tympanic membrane is not erythematous or bulging.      Left Ear: Hearing, tympanic membrane and external ear normal. Swelling present. Tympanic membrane is not erythematous or bulging.      Ears:        Comments: (+) Mild swelling/erythema of left auditory canal.   Erythema, swelling and dryness of tragus of left ear.      Mouth/Throat:      Pharynx: Oropharynx is clear. Uvula midline. No pharyngeal swelling, oropharyngeal exudate, posterior oropharyngeal erythema, uvula swelling or postnasal drip.      Tonsils: No tonsillar exudate or tonsillar abscesses. 1+ on the right. 1+ on the left.     Eyes:      Conjunctiva/sclera: Conjunctivae normal.     Neck:      Thyroid: No thyroid mass, thyromegaly or thyroid tenderness.     Cardiovascular:      Rate and Rhythm: Normal rate and regular rhythm.      Heart sounds: Normal heart sounds, S1 normal and S2 normal. Heart sounds not distant. No murmur heard.  Pulmonary:      Effort: Pulmonary effort is normal. No tachypnea, bradypnea, accessory muscle usage, prolonged expiration, respiratory distress or retractions. He is not intubated.     " " Breath sounds: No stridor, decreased air movement or transmitted upper airway sounds. No decreased breath sounds, wheezing, rhonchi or rales.   Abdominal:      Palpations: Abdomen is soft.      Tenderness: There is no abdominal tenderness.     Musculoskeletal:         General: No swelling.      Cervical back: Full passive range of motion without pain, normal range of motion and neck supple. No spinous process tenderness or muscular tenderness. Normal range of motion.   Lymphadenopathy:      Cervical: No cervical adenopathy.      Right cervical: No superficial cervical adenopathy.     Left cervical: No superficial cervical adenopathy.     Skin:     General: Skin is warm and dry.      Capillary Refill: Capillary refill takes less than 2 seconds.     Neurological:      Mental Status: He is alert.     Psychiatric:         Mood and Affect: Mood normal.         Portions of the record may have been created with voice recognition software.  Occasional wrong word or \"sound a like\" substitutions may have occurred due to the inherent limitations of voice recognition software.  Read the chart carefully and recognize, using context, where substitutions have occurred.       [1]   Allergies  Allergen Reactions    Amoxicillin      "

## 2025-05-19 ENCOUNTER — DOCUMENTATION (OUTPATIENT)
Dept: ADMINISTRATIVE | Facility: OTHER | Age: 45
End: 2025-05-19

## 2025-05-19 VITALS — DIASTOLIC BLOOD PRESSURE: 100 MMHG | SYSTOLIC BLOOD PRESSURE: 130 MMHG

## 2025-05-19 NOTE — PROGRESS NOTES
05/19/25 9:12 AM    Patient was called after the Urgent Care visit ; a message was left for the patient to return the call    Thank you.  MARIBEL RIOS MA  [facility]          Blood pressure elevated  Appointment department: [facility]  Appointment provider: MIESHA Paz  Blood pressure   05/16/25 1310 130/100   05/16/25 1205 (!) 151/101

## 2025-06-12 DIAGNOSIS — G47.33 OSA (OBSTRUCTIVE SLEEP APNEA): Primary | ICD-10-CM

## 2025-07-24 ENCOUNTER — TELEPHONE (OUTPATIENT)
Dept: NEUROLOGY | Facility: CLINIC | Age: 45
End: 2025-07-24

## 2025-07-24 NOTE — TELEPHONE ENCOUNTER
LMOM to confirm patient's appointment on  7/31 with Natalie. Attempted to confirm time, date, location.

## 2025-07-31 ENCOUNTER — OFFICE VISIT (OUTPATIENT)
Dept: NEUROLOGY | Facility: CLINIC | Age: 45
End: 2025-07-31
Payer: COMMERCIAL

## 2025-07-31 VITALS
HEART RATE: 66 BPM | TEMPERATURE: 98 F | BODY MASS INDEX: 31.72 KG/M2 | OXYGEN SATURATION: 98 % | WEIGHT: 226.6 LBS | DIASTOLIC BLOOD PRESSURE: 94 MMHG | SYSTOLIC BLOOD PRESSURE: 140 MMHG | HEIGHT: 71 IN

## 2025-07-31 DIAGNOSIS — M54.81 OCCIPITAL NEURALGIA: ICD-10-CM

## 2025-07-31 DIAGNOSIS — G43.009 MIGRAINE WITHOUT AURA AND WITHOUT STATUS MIGRAINOSUS, NOT INTRACTABLE: Primary | ICD-10-CM

## 2025-07-31 PROCEDURE — 99213 OFFICE O/P EST LOW 20 MIN: CPT

## 2025-08-07 ENCOUNTER — OFFICE VISIT (OUTPATIENT)
Dept: SLEEP CENTER | Facility: CLINIC | Age: 45
End: 2025-08-07
Payer: COMMERCIAL

## 2025-08-07 VITALS
OXYGEN SATURATION: 98 % | HEIGHT: 71 IN | DIASTOLIC BLOOD PRESSURE: 80 MMHG | WEIGHT: 227.8 LBS | HEART RATE: 84 BPM | BODY MASS INDEX: 31.89 KG/M2 | SYSTOLIC BLOOD PRESSURE: 132 MMHG

## 2025-08-07 DIAGNOSIS — G47.33 OSA (OBSTRUCTIVE SLEEP APNEA): Primary | ICD-10-CM

## 2025-08-07 DIAGNOSIS — G43.009 MIGRAINE WITHOUT AURA AND WITHOUT STATUS MIGRAINOSUS, NOT INTRACTABLE: ICD-10-CM

## 2025-08-07 DIAGNOSIS — I10 PRIMARY HYPERTENSION: ICD-10-CM

## 2025-08-07 PROCEDURE — 99244 OFF/OP CNSLTJ NEW/EST MOD 40: CPT | Performed by: STUDENT IN AN ORGANIZED HEALTH CARE EDUCATION/TRAINING PROGRAM

## 2025-08-08 ENCOUNTER — TELEPHONE (OUTPATIENT)
Dept: SLEEP CENTER | Facility: CLINIC | Age: 45
End: 2025-08-08

## 2025-08-08 LAB
DME PARACHUTE DELIVERY DATE REQUESTED: NORMAL
DME PARACHUTE ITEM DESCRIPTION: NORMAL
DME PARACHUTE ORDER STATUS: NORMAL
DME PARACHUTE SUPPLIER NAME: NORMAL
DME PARACHUTE SUPPLIER PHONE: NORMAL

## 2025-08-15 ENCOUNTER — TELEPHONE (OUTPATIENT)
Age: 45
End: 2025-08-15

## 2025-08-15 LAB
